# Patient Record
Sex: FEMALE | Race: WHITE | Employment: UNEMPLOYED | RURAL
[De-identification: names, ages, dates, MRNs, and addresses within clinical notes are randomized per-mention and may not be internally consistent; named-entity substitution may affect disease eponyms.]

---

## 2017-01-03 ENCOUNTER — OFFICE VISIT (OUTPATIENT)
Dept: FAMILY MEDICINE CLINIC | Age: 62
End: 2017-01-03

## 2017-01-03 VITALS
TEMPERATURE: 97.9 F | HEIGHT: 65 IN | OXYGEN SATURATION: 100 % | BODY MASS INDEX: 21.09 KG/M2 | HEART RATE: 85 BPM | DIASTOLIC BLOOD PRESSURE: 82 MMHG | SYSTOLIC BLOOD PRESSURE: 144 MMHG | RESPIRATION RATE: 18 BRPM | WEIGHT: 126.6 LBS

## 2017-01-03 DIAGNOSIS — E78.5 HYPERLIPIDEMIA ASSOCIATED WITH TYPE 2 DIABETES MELLITUS (HCC): Primary | ICD-10-CM

## 2017-01-03 DIAGNOSIS — E11.69 HYPERLIPIDEMIA ASSOCIATED WITH TYPE 2 DIABETES MELLITUS (HCC): Primary | ICD-10-CM

## 2017-01-03 DIAGNOSIS — F98.8 ADD (ATTENTION DEFICIT DISORDER): ICD-10-CM

## 2017-01-03 DIAGNOSIS — E11.9 WELL CONTROLLED TYPE 2 DIABETES MELLITUS (HCC): ICD-10-CM

## 2017-01-03 RX ORDER — SIMVASTATIN 20 MG/1
TABLET, FILM COATED ORAL
Qty: 90 TAB | Refills: 1 | Status: SHIPPED | OUTPATIENT
Start: 2017-01-03 | End: 2017-12-22 | Stop reason: SDUPTHER

## 2017-01-03 RX ORDER — DEXTROAMPHETAMINE SACCHARATE, AMPHETAMINE ASPARTATE, DEXTROAMPHETAMINE SULFATE AND AMPHETAMINE SULFATE 7.5; 7.5; 7.5; 7.5 MG/1; MG/1; MG/1; MG/1
TABLET ORAL
Qty: 30 TAB | Refills: 0 | Status: SHIPPED | OUTPATIENT
Start: 2017-01-03 | End: 2017-02-09 | Stop reason: SDUPTHER

## 2017-01-03 NOTE — PROGRESS NOTES
HISTORY OF PRESENT ILLNESS  Mona Tello is a 57 y.o. female. Follow-up   Pertinent negatives include no chest pain, no abdominal pain, no headaches and no shortness of breath. Medication Refill   Pertinent negatives include no chest pain, no abdominal pain, no headaches and no shortness of breath. ADD  She is here for a refill of her Adderall. She was started on this at least 10 years ago  by Dr Len Severs. She was started on this after her son was tested and she realized that this was happening to her as well. She is not able to focus on work without it. She takes it daily and if she skips on the weekends she doesn't get things done at home. She never did well in school due to her inattention. She has tried to go off in the past but did not function well at work. She has been taking it and took her last dose this am.  She does not have any side effects with the meds and is able to sleep. Appetite is pretty good. She takes the meds everyday. Diabetes  Current blood sugars at home have been pretty good fasting. Lab Results   Component Value Date/Time    Hemoglobin A1c 7.3 10/04/2016 11:54 AM   There have been a few  episodes of hypoglycemia when she does not eat right. Taking meds daily . On Lantus at 42 units daily. Is not going to be able to afford the Lantus and may need to change to something else. Last eye exam was a couple of years ago at [facility]. Checking feet daily. Review of Systems   Constitutional: Negative for malaise/fatigue and weight loss. HENT: Negative for congestion and sore throat. Respiratory: Negative for shortness of breath. Cardiovascular: Negative for chest pain and palpitations. Gastrointestinal: Negative for abdominal pain, blood in stool, diarrhea and heartburn. Genitourinary: Negative. Musculoskeletal: Positive for joint pain. Neurological: Negative for dizziness and headaches.    Psychiatric/Behavioral: Negative for depression, hallucinations and suicidal ideas. The patient is nervous/anxious. The patient does not have insomnia. Past Medical History   Diagnosis Date    ADD (attention deficit disorder)     Allergic rhinitis     Asthma     Bulimia     Diabetes ([facility]     Eczema     GERD (gastroesophageal reflux disease)     Headache(784.0)     Hyperlipidemia associated with type 2 diabetes mellitus ([facility] [address] [facility] 10/7/2016    Insomnia      Past Surgical History   Procedure Laterality Date    Hx hernia repair       Allergies   Allergen Reactions    Clindamycin Unknown (comments)    Codeine Unknown (comments)       Blood pressure 144/82, pulse 85, temperature 97.9 °F (36.6 °C), temperature source Oral, resp. rate 18, height 5' 4.5\" (1.638 m), weight 126 lb 9.6 oz (57.4 kg), SpO2 100 %. Physical Exam   Constitutional: She is oriented to person, place, and time. She appears well-developed and well-nourished. No distress. HENT:   Head: Normocephalic and atraumatic. Nose: Nose normal.   Mouth/Throat: Oropharynx is clear and moist.   Eyes: Conjunctivae are normal.   Neck: Neck supple. Cardiovascular: Normal rate, regular rhythm and normal heart sounds. Pulmonary/Chest: Effort normal and breath sounds normal. No respiratory distress. Neurological: She is alert and oriented to person, place, and time. Skin: Skin is warm and dry. Psychiatric: She has a normal mood and affect. Nursing note and vitals reviewed. ASSESSMENT and PLAN  ADD    UDS up to date and appropriately positive       Refill Adderall for the next 4 weeks                                           RTO 4 weeks so we can monitor her BP  Diabetes   Check labs- see orders   Continue Lantus for now. May need to change to relion if she loses her insurance. Advised regular eye exams  Elevated Blood pressure   Monitor Blood pressures at home and in the pharmacy. If it stays up will need to stop her Adderall.

## 2017-01-03 NOTE — MR AVS SNAPSHOT
Visit Information Date & Time Provider Department Dept. Phone Encounter #  
 1/3/2017  4:20 PM Tai Farmer MD [place] [id number] [facility] [phone] [id number] Follow-up Instructions Return in about 4 weeks (around 1/31/2017). Follow-up and Disposition History Upcoming Health Maintenance Date Due  
 EYE EXAM RETINAL OR DILATED Q1 6/3/1964 Pneumococcal 19-64 Medium Risk (1 of 1 - PPSV23) 6/3/1973 DTaP/Tdap/Td series (1 - Tdap) 6/3/1975 PAP AKA CERVICAL CYTOLOGY 6/3/1975 BREAST CANCER SCRN MAMMOGRAM 6/3/2004 FOBT Q 1 YEAR AGE 50-75 6/3/2004 ZOSTER VACCINE AGE 60> 6/3/2014 HEMOGLOBIN A1C Q6M 4/4/2017 FOOT EXAM Q1 10/4/2017 MICROALBUMIN Q1 10/4/2017 LIPID PANEL Q1 10/4/2017 Allergies as of 1/3/2017  Review Complete On: 1/3/2017 By: Tai Farmer MD  
  
 Severity Noted Reaction Type Reactions Clindamycin  07/02/2014    Unknown (comments) Codeine  07/02/2014    Unknown (comments) Current Immunizations  Never Reviewed No immunizations on file. Not reviewed this visit You Were Diagnosed With   
  
 Codes Comments Hyperlipidemia associated with type 2 diabetes mellitus ([facility] [address]    -  Primary ICD-10-CM: E11.69, E78.5 ICD-9-CM: 250.80, 272.4 Well controlled type 2 diabetes mellitus ([facility] [address]     ICD-10-CM: E11.9 ICD-9-CM: 250.00 ADD (attention deficit disorder)     ICD-10-CM: F98.8 ICD-9-CM: 314.00 Elevated blood pressure     ICD-10-CM: I10 
ICD-9-CM: 401.9 Vitals BP Pulse Temp Resp Height(growth percentile) Weight(growth percentile) 144/82 85 97.9 °F (36.6 °C) (Oral) 18 5' 4.5\" (1.638 m) 126 lb 9.6 oz (57.4 kg) SpO2 BMI OB Status Smoking Status 100% 21.4 kg/m2 Menopause Former Smoker Vitals History BMI and BSA Data Body Mass Index Body Surface Area  
 21.4 kg/m 2 1.62 m 2 Preferred Pharmacy Pharmacy Name Phone [facility], [place] - [address] [phone] Your Updated Medication List  
  
   
This list is accurate as of: 1/3/17  5:05 PM.  Always use your most recent med list.  
  
  
  
  
 amitriptyline 25 mg tablet Commonly known as:  ELAVIL TAKE THREE TABLETS BY MOUTH AT BEDTIME  
  
 dextroamphetamine-amphetamine 30 mg tablet Commonly known as:  ADDERALL Take 1 daily FLUoxetine 20 mg capsule Commonly known as:  PROzac TAKE ONE CAPSULE BY MOUTH ONCE DAILY  
  
 LANTUS 100 unit/mL injection Generic drug:  insulin glargine  
by SubCUTAneous route nightly. 42 units  
  
 simvastatin 20 mg tablet Commonly known as:  ZOCOR  
TAKE 1 TABLET BY MOUTH NIGHTLY Prescriptions Printed Refills  
 dextroamphetamine-amphetamine (ADDERALL) 30 mg tablet 0 Sig: Take 1 daily Class: Print Prescriptions Sent to Pharmacy Refills  
 simvastatin (ZOCOR) 20 mg tablet 1 Sig: TAKE 1 TABLET BY MOUTH NIGHTLY Class: Normal  
 Pharmacy: [address] [facility], [address] [place] [address] [address] Ph #: [phone] Follow-up Instructions Return in about 4 weeks (around 1/31/2017). Introducing [facility] & HEALTH SERVICES! Lenore Hussein introduces [employer] patient portal. Now you can access parts of your medical record, email your doctor's office, and request medication refills online. 1. In your internet browser, go to [email or web address] 2. Click on the First Time User? Click Here link in the Sign In box. You will see the New Member Sign Up page. 3. Enter your [employer] Access Code exactly as it appears below. You will not need to use this code after youve completed the sign-up process. If you do not sign up before the expiration date, you must request a new code. · [employer] Access Code: [email or web address] Expires: 4/3/2017  5:05 PM 
 
4.  Enter the last four digits of your Social Security Number (xxxx) and Date of Birth (mm/dd/yyyy) as indicated and click Submit. You will be taken to the next sign-up page. 5. Create a [employer] ID. This will be your [employer] login ID and cannot be changed, so think of one that is secure and easy to remember. 6. Create a [employer] password. You can change your password at any time. 7. Enter your Password Reset Question and Answer. This can be used at a later time if you forget your password. 8. Enter your e-mail address. You will receive e-mail notification when new information is available in [address] 9. Click Sign Up. You can now view and download portions of your medical record. 10. Click the Download Summary menu link to download a portable copy of your medical information. If you have questions, please visit the Frequently Asked Questions section of the [employer] website. Remember, [employer] is NOT to be used for urgent needs. For medical emergencies, dial 911. Now available from your iPhone and Android! Please provide this summary of care documentation to your next provider. Your primary care clinician is listed as Sunil Bourgeois. If you have any questions after today's visit, please call [phone].

## 2017-01-04 LAB
ALBUMIN SERPL-MCNC: 4.4 G/DL (ref 3.6–4.8)
ALBUMIN/GLOB SERPL: 1.8 {RATIO} (ref 1.1–2.5)
ALP SERPL-CCNC: 51 IU/L (ref 39–117)
ALT SERPL-CCNC: 12 IU/L (ref 0–32)
AST SERPL-CCNC: 14 IU/L (ref 0–40)
BILIRUB SERPL-MCNC: 0.2 MG/DL (ref 0–1.2)
BUN SERPL-MCNC: 17 MG/DL (ref 8–27)
BUN/CREAT SERPL: 26 (ref 11–26)
CALCIUM SERPL-MCNC: 9.7 MG/DL (ref 8.7–10.3)
CHLORIDE SERPL-SCNC: 100 MMOL/L (ref 96–106)
CO2 SERPL-SCNC: 26 MMOL/L (ref 18–29)
CREAT SERPL-MCNC: 0.65 MG/DL (ref 0.57–1)
EST. AVERAGE GLUCOSE BLD GHB EST-MCNC: 177 MG/DL
GLOBULIN SER CALC-MCNC: 2.5 G/DL (ref 1.5–4.5)
GLUCOSE SERPL-MCNC: 79 MG/DL (ref 65–99)
HBA1C MFR BLD: 7.8 % (ref 4.8–5.6)
POTASSIUM SERPL-SCNC: 4 MMOL/L (ref 3.5–5.2)
PROT SERPL-MCNC: 6.9 G/DL (ref 6–8.5)
SODIUM SERPL-SCNC: 141 MMOL/L (ref 134–144)

## 2017-01-04 NOTE — PROGRESS NOTES
A1c has gone up from last time. Increase Lantus to 45 units daily. Recheck A1c in 3 months. Monitor her BP as we discussed and RTO in 4 weeks with her BP log and to recheck her BP in the office.

## 2017-01-25 RX ORDER — INSULIN GLARGINE 100 [IU]/ML
42 INJECTION, SOLUTION SUBCUTANEOUS EVERY EVENING
Qty: 5 EACH | Refills: 0 | Status: SHIPPED | OUTPATIENT
Start: 2017-01-25 | End: 2017-03-17 | Stop reason: SDUPTHER

## 2017-01-25 NOTE — TELEPHONE ENCOUNTER
Patient requesting refill on Lantus. Last seen 10/4/16. Has f/u appointment on 1/31/17. Jose gave her a coupon for a $10 script. Requesting a box of 5 pens.

## 2017-01-31 RX ORDER — AMITRIPTYLINE HYDROCHLORIDE 25 MG/1
TABLET, FILM COATED ORAL
Qty: 90 TAB | Refills: 0 | Status: SHIPPED | OUTPATIENT
Start: 2017-01-31 | End: 2017-03-04 | Stop reason: SDUPTHER

## 2017-02-06 RX ORDER — FLUOXETINE HYDROCHLORIDE 20 MG/1
CAPSULE ORAL
Qty: 90 CAP | Refills: 0 | Status: SHIPPED | OUTPATIENT
Start: 2017-02-06 | End: 2017-05-15 | Stop reason: SDUPTHER

## 2017-02-09 DIAGNOSIS — F98.8 ADD (ATTENTION DEFICIT DISORDER): ICD-10-CM

## 2017-02-09 NOTE — TELEPHONE ENCOUNTER
Patient called requesting refill on Adderall 30mg. Patient scheduled an appointment with Dr. Ezequiel Hart on 2/16/17 since she could not get in to see Dr. Burrell Most until after 3/28/17. Patient also states she has been monitoring her blood pressure readings which have been much better. She believes it was \" coming from the Ibuprofen. \"

## 2017-02-13 RX ORDER — DEXTROAMPHETAMINE SACCHARATE, AMPHETAMINE ASPARTATE, DEXTROAMPHETAMINE SULFATE AND AMPHETAMINE SULFATE 7.5; 7.5; 7.5; 7.5 MG/1; MG/1; MG/1; MG/1
TABLET ORAL
Qty: 30 TAB | Refills: 0 | Status: SHIPPED | OUTPATIENT
Start: 2017-02-13 | End: 2017-03-27 | Stop reason: SDUPTHER

## 2017-02-16 ENCOUNTER — OFFICE VISIT (OUTPATIENT)
Dept: FAMILY MEDICINE CLINIC | Age: 62
End: 2017-02-16

## 2017-02-16 ENCOUNTER — TELEPHONE (OUTPATIENT)
Dept: FAMILY MEDICINE CLINIC | Age: 62
End: 2017-02-16

## 2017-02-16 VITALS
HEIGHT: 65 IN | RESPIRATION RATE: 18 BRPM | OXYGEN SATURATION: 97 % | SYSTOLIC BLOOD PRESSURE: 138 MMHG | HEART RATE: 89 BPM | DIASTOLIC BLOOD PRESSURE: 79 MMHG | TEMPERATURE: 97.2 F | BODY MASS INDEX: 21.33 KG/M2 | WEIGHT: 128 LBS

## 2017-02-16 DIAGNOSIS — R03.0 ELEVATED BLOOD PRESSURE READING WITHOUT DIAGNOSIS OF HYPERTENSION: Primary | ICD-10-CM

## 2017-02-16 DIAGNOSIS — M65.342 TRIGGER FINGER, LEFT RING FINGER: ICD-10-CM

## 2017-02-16 DIAGNOSIS — M65.341 TRIGGER FINGER, RIGHT RING FINGER: ICD-10-CM

## 2017-02-16 RX ORDER — TRIAMCINOLONE ACETONIDE 40 MG/ML
40 INJECTION, SUSPENSION INTRA-ARTICULAR; INTRAMUSCULAR ONCE
Qty: 1 ML | Refills: 0
Start: 2017-02-16 | End: 2017-02-16

## 2017-02-16 NOTE — MR AVS SNAPSHOT
Visit Information Date & Time Provider Department Dept. Phone Encounter #  
 2/16/2017  7:00 AM Daniel Ceja MD [facility] [phone] [id number] Upcoming Health Maintenance Date Due  
 EYE EXAM RETINAL OR DILATED Q1 6/3/1964 Pneumococcal 19-64 Medium Risk (1 of 1 - PPSV23) 6/3/1973 DTaP/Tdap/Td series (1 - Tdap) 6/3/1975 PAP AKA CERVICAL CYTOLOGY 6/3/1975 BREAST CANCER SCRN MAMMOGRAM 6/3/2004 FOBT Q 1 YEAR AGE 50-75 6/3/2004 ZOSTER VACCINE AGE 60> 6/3/2014 HEMOGLOBIN A1C Q6M 7/3/2017 FOOT EXAM Q1 10/4/2017 MICROALBUMIN Q1 10/4/2017 LIPID PANEL Q1 10/4/2017 Allergies as of 2/16/2017  Review Complete On: 2/16/2017 By: Daniel Ceja MD  
  
 Severity Noted Reaction Type Reactions Clindamycin  07/02/2014    Unknown (comments) Codeine  07/02/2014    Unknown (comments) Current Immunizations  Never Reviewed No immunizations on file. Not reviewed this visit Vitals BP Pulse Temp Resp Height(growth percentile) Weight(growth percentile) 138/79 (BP 1 Location: Left arm, BP Patient Position: Sitting) 89 97.2 °F (36.2 °C) 18 5' 4.5\" (1.638 m) 128 lb (58.1 kg) SpO2 BMI OB Status Smoking Status 97% 21.63 kg/m2 Menopause Former Smoker BMI and BSA Data Body Mass Index Body Surface Area  
 21.63 kg/m 2 1.63 m 2 Preferred Pharmacy Pharmacy Name Phone [facility] [place] [facility] [place] [address], [place]  [address] [phone] Your Updated Medication List  
  
   
This list is accurate as of: 2/16/17  7:48 AM.  Always use your most recent med list.  
  
  
  
  
 amitriptyline 25 mg tablet Commonly known as:  ELAVIL TAKE THREE TABLETS BY MOUTH AT BEDTIME  
  
 dextroamphetamine-amphetamine 30 mg tablet Commonly known as:  ADDERALL Earliest Fill Date: 2/13/17. Take 1 daily FLUoxetine 20 mg capsule Commonly known as:  PROzac  
 TAKE ONE CAPSULE BY MOUTH ONCE DAILY * LANTUS 100 unit/mL injection Generic drug:  insulin glargine  
by SubCUTAneous route nightly. 42 units * insulin glargine 100 unit/mL (3 mL) pen Commonly known as:  LANTUS SOLOSTAR  
42 Units by SubCUTAneous route every evening. simvastatin 20 mg tablet Commonly known as:  ZOCOR  
TAKE 1 TABLET BY MOUTH NIGHTLY * Notice: This list has 2 medication(s) that are the same as other medications prescribed for you. Read the directions carefully, and ask your doctor or other care provider to review them with you. Introducing [facility] & HE[facility]! Leticia Tan introduces [employer] patient portal. Now you can access parts of your medical record, email your doctor's office, and request medication refills online. 1. In your internet browser, go to [email or web address] 2. Click on the First Time User? Click Here link in the Sign In box. You will see the New Member Sign Up page. 3. Enter your [employer] Access Code exactly as it appears below. You will not need to use this code after youve completed the sign-up process. If you do not sign up before the expiration date, you must request a new code. · [employer] Access Code: [email or web address] Expires: 4/3/2017  5:05 PM 
 
4. Enter the last four digits of your Social Security Number (xxxx) and Date of Birth (mm/dd/yyyy) as indicated and click Submit. You will be taken to the next sign-up page. 5. Create a [employer] ID. This will be your [employer] login ID and cannot be changed, so think of one that is secure and easy to remember. 6. Create a [employer] password. You can change your password at any time. 7. Enter your Password Reset Question and Answer. This can be used at a later time if you forget your password. 8. Enter your e-mail address. You will receive e-mail notification when new information is available in [address] 9. Click Sign Up. You can now view and download portions of your medical record. 10. Click the Download Summary menu link to download a portable copy of your medical information. If you have questions, please visit the Frequently Asked Questions section of the [email or web address] website. Remember, [email or web address] is NOT to be used for urgent needs. For medical emergencies, dial 911. Now available from your iPhone and Android! Please provide this summary of care documentation to your next provider. Your primary care clinician is listed as Nicolle Garcia. If you have any questions after today's visit, please call [phone].

## 2017-02-16 NOTE — PROGRESS NOTES
Julio Vasquez is a 57 y.o. female who presents with the following:  Chief Complaint   Patient presents with    Blood Pressure Check     htn       Blood Pressure Check   The history is provided by the patient (Patient has had an episode of elevated blood pressure on last visit that almost came back to normal before leaving but was told to check back at another time. Patient has done well with no chest pain shortness of breath nor edema). Pertinent negatives include no chest pain, no abdominal pain, no headaches and no shortness of breath. Associated symptoms comments: The patient has been noting trigger finger in her left and right ring fingers. She wonders if something besides surgery can be done. .       Allergies   Allergen Reactions    Clindamycin Unknown (comments)    Codeine Unknown (comments)       Current Outpatient Prescriptions   Medication Sig    dextroamphetamine-amphetamine (ADDERALL) 30 mg tablet Earliest Fill Date: 2/13/17. Take 1 daily    FLUoxetine (PROZAC) 20 mg capsule TAKE ONE CAPSULE BY MOUTH ONCE DAILY    amitriptyline (ELAVIL) 25 mg tablet TAKE THREE TABLETS BY MOUTH AT BEDTIME    insulin glargine (LANTUS SOLOSTAR) 100 unit/mL (3 mL) pen 42 Units by SubCUTAneous route every evening.  simvastatin (ZOCOR) 20 mg tablet TAKE 1 TABLET BY MOUTH NIGHTLY    insulin glargine (LANTUS) 100 unit/mL injection by SubCUTAneous route nightly. 42 units     No current facility-administered medications for this visit.         Past Medical History   Diagnosis Date    ADD (attention deficit disorder)     Allergic rhinitis     Asthma     Bulimia     Diabetes ([address]     Eczema     GERD (gastroesophageal reflux disease)     Headache(784.0)     Hyperlipidemia associated with type 2 diabetes mellitus ([facility] [address] 10/7/2016    Insomnia        Past Surgical History   Procedure Laterality Date    Hx hernia repair         Family History   Problem Relation Age of Onset    Cancer Father      colon    COPD Father     Diabetes Other     Cancer Other      colon, grandfather/ lung cancer uncles    Hypertension Mother     Heart Failure Mother     Obesity Brother 37       Social History     Social History    Marital status: SINGLE     Spouse name: N/A    Number of children: N/A    Years of education: N/A     Social History Main Topics    Smoking status: Former Smoker    Smokeless tobacco: Never Used    Alcohol use No    Drug use: Not on file    Sexual activity: Not on file     Other Topics Concern    Not on file     Social History Narrative       Review of Systems   Respiratory: Negative for shortness of breath. Cardiovascular: Negative for chest pain. Gastrointestinal: Negative for abdominal pain. Neurological: Negative for headaches. Visit Vitals    /79 (BP 1 Location: Left arm, BP Patient Position: Sitting)    Pulse 89    Temp 97.2 °F (36.2 °C)    Resp 18    Ht 5' 4.5\" (1.638 m)    Wt 128 lb (58.1 kg)    SpO2 97%    BMI 21.63 kg/m2     Physical Exam   Constitutional: She is oriented to person, place, and time and well-developed, well-nourished, and in no distress. HENT:   Head: Normocephalic and atraumatic. Right Ear: External ear normal.   Left Ear: External ear normal.   Mouth/Throat: Oropharynx is clear and moist.   Eyes: Conjunctivae and EOM are normal. Pupils are equal, round, and reactive to light. Right eye exhibits no discharge. Left eye exhibits no discharge. Neck: Normal range of motion. Neck supple. No tracheal deviation present. No thyromegaly present. Cardiovascular: Normal rate, regular rhythm, normal heart sounds and intact distal pulses. Exam reveals no gallop and no friction rub. No murmur heard. Pulmonary/Chest: Effort normal and breath sounds normal. No respiratory distress. She has no wheezes. She exhibits no tenderness. Abdominal: Soft. Bowel sounds are normal. She exhibits no distension and no mass. There is no tenderness.  There is no rebound and no guarding. Musculoskeletal: She exhibits no edema or tenderness. Patient has trigger finger and the left ring and right ring finger   Lymphadenopathy:     She has no cervical adenopathy. Neurological: She is alert and oriented to person, place, and time. She has normal reflexes. No cranial nerve deficit. She exhibits normal muscle tone. Gait normal. Coordination normal.   Skin: Skin is warm and dry. No rash noted. No erythema. No pallor. Psychiatric: Mood, memory, affect and judgment normal.         ICD-10-CM ICD-9-CM    1. Elevated blood pressure reading without diagnosis of hypertension R03.0 796.2    2. Trigger finger, left ring finger M65.342 727.03 INJECT TENDON SHEATH/LIGAMENT   3. Trigger finger, right ring finger M65.341 727.03        Orders Placed This Encounter    INJECT TENDON SHEATH/LIGAMENT    patient was prepped with alcohol after reviewing the procedure of tendon sheath injection and the area around the tendon sheath was injected with half cc (20 mg) of Kenalog with 1% lidocaine anesthetic of half cc. Patient had a small amount of bruising about the area of the injection site but otherwise no other problems. As the left ring finger trigger had been injected, the patient was told to return to have the right ring finger done if she was satisfied with the results of the injection on the left. The patient was told that generally the injections will last from 3 months to 1 year before the condition returns. The patient was asked to keep her salt intake down and to keep her weight down to help minimize the risk of her blood pressure going back up. The patient was told that the Kenalog would make her blood sugars go up and she could expect that and that it could cause her to retain some fluid which would make her blood pressure go up temporarily.     Follow-up Disposition: Not on Lizzie Antunez MD

## 2017-03-04 RX ORDER — AMITRIPTYLINE HYDROCHLORIDE 25 MG/1
TABLET, FILM COATED ORAL
Qty: 90 TAB | Refills: 0 | Status: SHIPPED | OUTPATIENT
Start: 2017-03-04 | End: 2017-03-29 | Stop reason: SDUPTHER

## 2017-03-17 RX ORDER — INSULIN GLARGINE 100 [IU]/ML
INJECTION, SOLUTION SUBCUTANEOUS
Qty: 15 ADJUSTABLE DOSE PRE-FILLED PEN SYRINGE | Refills: 0 | Status: SHIPPED | OUTPATIENT
Start: 2017-03-17 | End: 2017-05-02 | Stop reason: SDUPTHER

## 2017-03-27 DIAGNOSIS — F98.8 ADD (ATTENTION DEFICIT DISORDER): ICD-10-CM

## 2017-03-28 RX ORDER — DEXTROAMPHETAMINE SACCHARATE, AMPHETAMINE ASPARTATE, DEXTROAMPHETAMINE SULFATE AND AMPHETAMINE SULFATE 7.5; 7.5; 7.5; 7.5 MG/1; MG/1; MG/1; MG/1
TABLET ORAL
Qty: 30 TAB | Refills: 0 | Status: SHIPPED | OUTPATIENT
Start: 2017-03-28 | End: 2017-05-02 | Stop reason: SDUPTHER

## 2017-03-30 RX ORDER — AMITRIPTYLINE HYDROCHLORIDE 25 MG/1
TABLET, FILM COATED ORAL
Qty: 90 TAB | Refills: 0 | Status: SHIPPED | OUTPATIENT
Start: 2017-03-30 | End: 2017-04-30 | Stop reason: SDUPTHER

## 2017-04-30 RX ORDER — AMITRIPTYLINE HYDROCHLORIDE 25 MG/1
TABLET, FILM COATED ORAL
Qty: 90 TAB | Refills: 0 | Status: SHIPPED | OUTPATIENT
Start: 2017-04-30 | End: 2017-05-28 | Stop reason: SDUPTHER

## 2017-05-02 DIAGNOSIS — F98.8 ADD (ATTENTION DEFICIT DISORDER): ICD-10-CM

## 2017-05-02 RX ORDER — INSULIN GLARGINE 100 [IU]/ML
INJECTION, SOLUTION SUBCUTANEOUS
Qty: 15 ADJUSTABLE DOSE PRE-FILLED PEN SYRINGE | Refills: 0 | Status: SHIPPED | OUTPATIENT
Start: 2017-05-02 | End: 2017-06-18 | Stop reason: SDUPTHER

## 2017-05-02 RX ORDER — DEXTROAMPHETAMINE SACCHARATE, AMPHETAMINE ASPARTATE, DEXTROAMPHETAMINE SULFATE AND AMPHETAMINE SULFATE 7.5; 7.5; 7.5; 7.5 MG/1; MG/1; MG/1; MG/1
TABLET ORAL
Qty: 30 TAB | Refills: 0 | Status: SHIPPED | OUTPATIENT
Start: 2017-05-02 | End: 2017-06-13 | Stop reason: SDUPTHER

## 2017-05-15 RX ORDER — FLUOXETINE HYDROCHLORIDE 20 MG/1
CAPSULE ORAL
Qty: 90 CAP | Refills: 0 | Status: SHIPPED | OUTPATIENT
Start: 2017-05-15 | End: 2017-07-30 | Stop reason: SDUPTHER

## 2017-05-28 RX ORDER — AMITRIPTYLINE HYDROCHLORIDE 25 MG/1
TABLET, FILM COATED ORAL
Qty: 90 TAB | Refills: 0 | Status: SHIPPED | OUTPATIENT
Start: 2017-05-28 | End: 2017-06-29 | Stop reason: SDUPTHER

## 2017-06-12 DIAGNOSIS — F98.8 ADD (ATTENTION DEFICIT DISORDER): ICD-10-CM

## 2017-06-12 RX ORDER — DEXTROAMPHETAMINE SACCHARATE, AMPHETAMINE ASPARTATE, DEXTROAMPHETAMINE SULFATE AND AMPHETAMINE SULFATE 7.5; 7.5; 7.5; 7.5 MG/1; MG/1; MG/1; MG/1
TABLET ORAL
Qty: 30 TAB | Refills: 0 | OUTPATIENT
Start: 2017-06-12

## 2017-06-13 ENCOUNTER — OFFICE VISIT (OUTPATIENT)
Dept: FAMILY MEDICINE CLINIC | Age: 62
End: 2017-06-13

## 2017-06-13 VITALS
TEMPERATURE: 97.2 F | WEIGHT: 124 LBS | DIASTOLIC BLOOD PRESSURE: 88 MMHG | RESPIRATION RATE: 18 BRPM | HEIGHT: 65 IN | OXYGEN SATURATION: 95 % | HEART RATE: 74 BPM | BODY MASS INDEX: 20.66 KG/M2 | SYSTOLIC BLOOD PRESSURE: 152 MMHG

## 2017-06-13 DIAGNOSIS — E11.9 WELL CONTROLLED TYPE 2 DIABETES MELLITUS (HCC): Primary | ICD-10-CM

## 2017-06-13 DIAGNOSIS — F98.8 ADD (ATTENTION DEFICIT DISORDER): ICD-10-CM

## 2017-06-13 RX ORDER — DEXTROAMPHETAMINE SACCHARATE, AMPHETAMINE ASPARTATE, DEXTROAMPHETAMINE SULFATE AND AMPHETAMINE SULFATE 7.5; 7.5; 7.5; 7.5 MG/1; MG/1; MG/1; MG/1
TABLET ORAL
Qty: 30 TAB | Refills: 0 | Status: SHIPPED | OUTPATIENT
Start: 2017-06-13 | End: 2017-06-13 | Stop reason: SDUPTHER

## 2017-06-13 RX ORDER — DEXTROAMPHETAMINE SACCHARATE, AMPHETAMINE ASPARTATE, DEXTROAMPHETAMINE SULFATE AND AMPHETAMINE SULFATE 7.5; 7.5; 7.5; 7.5 MG/1; MG/1; MG/1; MG/1
TABLET ORAL
Qty: 30 TAB | Refills: 0 | Status: SHIPPED | OUTPATIENT
Start: 2017-06-13 | End: 2017-10-10 | Stop reason: SDUPTHER

## 2017-06-13 NOTE — MR AVS SNAPSHOT
Visit Information Date & Time Provider Department Dept. Phone Encounter #  
 6/13/2017  5:40 PM Amandeep Encinas MD [facility] Primary [facility] [phone] [id number] Follow-up Instructions Return in about 3 months (around 9/13/2017). Upcoming Health Maintenance Date Due  
 EYE EXAM RETINAL OR DILATED Q1 6/3/1964 Pneumococcal 19-64 Medium Risk (1 of 1 - PPSV23) 6/3/1973 DTaP/Tdap/Td series (1 - Tdap) 6/3/1975 PAP AKA CERVICAL CYTOLOGY 6/3/1975 BREAST CANCER SCRN MAMMOGRAM 6/3/2004 FOBT Q 1 YEAR AGE 50-75 6/3/2004 ZOSTER VACCINE AGE 60> 6/3/2014 HEMOGLOBIN A1C Q6M 7/3/2017 INFLUENZA AGE 9 TO ADULT 8/1/2017 FOOT EXAM Q1 10/4/2017 MICROALBUMIN Q1 10/4/2017 LIPID PANEL Q1 10/4/2017 Allergies as of 6/13/2017  Review Complete On: 6/13/2017 By: Amandeep Encinas MD  
  
 Severity Noted Reaction Type Reactions Clindamycin  07/02/2014    Unknown (comments) Codeine  07/02/2014    Unknown (comments) Current Immunizations  Never Reviewed No immunizations on file. Not reviewed this visit You Were Diagnosed With   
  
 Codes Comments Well controlled type 2 diabetes mellitus ([facility]    -  Primary ICD-10-CM: E11.9 ICD-9-CM: 250.00 ADD (attention deficit disorder)     ICD-10-CM: F98.8 ICD-9-CM: 314.00 Vitals BP Pulse Temp Resp Height(growth percentile) Weight(growth percentile) 152/88 (BP 1 Location: Left arm, BP Patient Position: Sitting) 74 97.2 °F (36.2 °C) 18 5' 4.5\" (1.638 m) 124 lb (56.2 kg) SpO2 BMI OB Status Smoking Status 95% 20.96 kg/m2 Menopause Former Smoker BMI and BSA Data Body Mass Index Body Surface Area  
 20.96 kg/m 2 1.6 m 2 Preferred Pharmacy Pharmacy Name Phone [facility] [address], [place] - [address] [phone] Your Updated Medication List  
  
   
 This list is accurate as of: 6/13/17  6:03 PM.  Always use your most recent med list.  
  
  
  
  
 amitriptyline 25 mg tablet Commonly known as:  ELAVIL TAKE THREE TABLETS BY MOUTH AT BEDTIME  
  
 dextroamphetamine-amphetamine 30 mg tablet Commonly known as:  ADDERALL Take 1 daily- fill 8/9/17 FLUoxetine 20 mg capsule Commonly known as:  PROzac TAKE ONE CAPSULE BY MOUTH ONCE DAILY * LANTUS 100 unit/mL injection Generic drug:  insulin glargine  
by SubCUTAneous route nightly. 42 units * LANTUS SOLOSTAR 100 unit/mL (3 mL) Inpn Generic drug:  insulin glargine INJECT 42 UNITS SUBCUTANEOUSLY EVERY EVENING  
  
 simvastatin 20 mg tablet Commonly known as:  ZOCOR  
TAKE 1 TABLET BY MOUTH NIGHTLY * Notice: This list has 2 medication(s) that are the same as other medications prescribed for you. Read the directions carefully, and ask your doctor or other care provider to review them with you. Prescriptions Printed Refills  
 dextroamphetamine-amphetamine (ADDERALL) 30 mg tablet 0 Sig: Take 1 daily- fill 8/9/17 Class: Print We Performed the Following COLLECTION VENOUS BLOOD,VENIPUNCTURE I5000793 CPT(R)] HEMOGLOBIN A1C WITH EAG [15364 CPT(R)] METABOLIC PANEL, COMPREHENSIVE [97082 CPT(R)] Follow-up Instructions Return in about 3 months (around 9/13/2017). Introducing \[facility]\"" & HEALTH SERVICES! Mehnaz Reyes introduces First Meta patient portal. Now you can access parts of your medical record, email your doctor's office, and request medication refills online. 1. In your internet browser, go to [email or web address] 2. Click on the First Time User? Click Here link in the Sign In box. You will see the New Member Sign Up page. 3. Enter your First Meta Access Code exactly as it appears below. You will not need to use this code after youve completed the sign-up process.  If you do not sign up before the expiration date, you must request a new code. · Kyron Access Code: [email or web address] Expires: 9/11/2017  6:03 PM 
 
4. Enter the last four digits of your Social Security Number (xxxx) and Date of Birth (mm/dd/yyyy) as indicated and click Submit. You will be taken to the next sign-up page. 5. Create a Kyron ID. This will be your Kyron login ID and cannot be changed, so think of one that is secure and easy to remember. 6. Create a Kyron password. You can change your password at any time. 7. Enter your Password Reset Question and Answer. This can be used at a later time if you forget your password. 8. Enter your e-mail address. You will receive e-mail notification when new information is available in [address] 9. Click Sign Up. You can now view and download portions of your medical record. 10. Click the Download Summary menu link to download a portable copy of your medical information. If you have questions, please visit the Frequently Asked Questions section of the Kyron website. Remember, Kyron is NOT to be used for urgent needs. For medical emergencies, dial 911. Now available from your iPhone and Android! Please provide this summary of care documentation to your next provider. Your primary care clinician is listed as Philomena Patel. If you have any questions after today's visit, please call [phone].

## 2017-06-15 LAB
ALBUMIN SERPL-MCNC: 3.9 G/DL (ref 3.6–4.8)
ALBUMIN/GLOB SERPL: 1.6 {RATIO} (ref 1.2–2.2)
ALP SERPL-CCNC: 46 IU/L (ref 39–117)
ALT SERPL-CCNC: 15 IU/L (ref 0–32)
AST SERPL-CCNC: 17 IU/L (ref 0–40)
BILIRUB SERPL-MCNC: <0.2 MG/DL (ref 0–1.2)
BUN SERPL-MCNC: 18 MG/DL (ref 8–27)
BUN/CREAT SERPL: 27 (ref 12–28)
CALCIUM SERPL-MCNC: 9.4 MG/DL (ref 8.7–10.3)
CHLORIDE SERPL-SCNC: 95 MMOL/L (ref 96–106)
CO2 SERPL-SCNC: 26 MMOL/L (ref 18–29)
CREAT SERPL-MCNC: 0.66 MG/DL (ref 0.57–1)
EST. AVERAGE GLUCOSE BLD GHB EST-MCNC: 174 MG/DL
GLOBULIN SER CALC-MCNC: 2.4 G/DL (ref 1.5–4.5)
GLUCOSE SERPL-MCNC: 287 MG/DL (ref 65–99)
HBA1C MFR BLD: 7.7 % (ref 4.8–5.6)
POTASSIUM SERPL-SCNC: 4.3 MMOL/L (ref 3.5–5.2)
PROT SERPL-MCNC: 6.3 G/DL (ref 6–8.5)
SODIUM SERPL-SCNC: 135 MMOL/L (ref 134–144)

## 2017-06-19 RX ORDER — INSULIN GLARGINE 100 [IU]/ML
INJECTION, SOLUTION SUBCUTANEOUS
Qty: 15 ADJUSTABLE DOSE PRE-FILLED PEN SYRINGE | Refills: 3 | Status: SHIPPED | OUTPATIENT
Start: 2017-06-19 | End: 2017-11-30 | Stop reason: SDUPTHER

## 2017-06-29 RX ORDER — AMITRIPTYLINE HYDROCHLORIDE 25 MG/1
TABLET, FILM COATED ORAL
Qty: 90 TAB | Refills: 0 | Status: SHIPPED | OUTPATIENT
Start: 2017-06-29 | End: 2017-07-30 | Stop reason: SDUPTHER

## 2017-07-31 RX ORDER — FLUOXETINE HYDROCHLORIDE 20 MG/1
CAPSULE ORAL
Qty: 90 CAP | Refills: 0 | Status: SHIPPED | OUTPATIENT
Start: 2017-07-31 | End: 2017-09-29 | Stop reason: SDUPTHER

## 2017-07-31 RX ORDER — AMITRIPTYLINE HYDROCHLORIDE 25 MG/1
TABLET, FILM COATED ORAL
Qty: 90 TAB | Refills: 0 | Status: SHIPPED | OUTPATIENT
Start: 2017-07-31 | End: 2017-08-29 | Stop reason: SDUPTHER

## 2017-08-30 RX ORDER — AMITRIPTYLINE HYDROCHLORIDE 25 MG/1
TABLET, FILM COATED ORAL
Qty: 90 TAB | Refills: 0 | Status: SHIPPED | OUTPATIENT
Start: 2017-08-30 | End: 2017-09-29 | Stop reason: SDUPTHER

## 2017-09-29 RX ORDER — AMITRIPTYLINE HYDROCHLORIDE 25 MG/1
TABLET, FILM COATED ORAL
Qty: 90 TAB | Refills: 0 | Status: SHIPPED | OUTPATIENT
Start: 2017-09-29 | End: 2017-10-31 | Stop reason: SDUPTHER

## 2017-09-29 RX ORDER — FLUOXETINE HYDROCHLORIDE 20 MG/1
CAPSULE ORAL
Qty: 90 CAP | Refills: 0 | Status: SHIPPED | OUTPATIENT
Start: 2017-09-29 | End: 2017-11-19 | Stop reason: SDUPTHER

## 2017-10-10 ENCOUNTER — OFFICE VISIT (OUTPATIENT)
Dept: FAMILY MEDICINE CLINIC | Age: 62
End: 2017-10-10

## 2017-10-10 VITALS
DIASTOLIC BLOOD PRESSURE: 89 MMHG | RESPIRATION RATE: 18 BRPM | SYSTOLIC BLOOD PRESSURE: 137 MMHG | BODY MASS INDEX: 21.04 KG/M2 | WEIGHT: 123.25 LBS | OXYGEN SATURATION: 98 % | HEIGHT: 64 IN | HEART RATE: 82 BPM

## 2017-10-10 DIAGNOSIS — E11.9 WELL CONTROLLED TYPE 2 DIABETES MELLITUS (HCC): Primary | ICD-10-CM

## 2017-10-10 DIAGNOSIS — F90.0 ATTENTION DEFICIT HYPERACTIVITY DISORDER (ADHD), PREDOMINANTLY INATTENTIVE TYPE: ICD-10-CM

## 2017-10-10 DIAGNOSIS — Z12.39 SCREENING FOR BREAST CANCER: ICD-10-CM

## 2017-10-10 RX ORDER — DEXTROAMPHETAMINE SACCHARATE, AMPHETAMINE ASPARTATE, DEXTROAMPHETAMINE SULFATE AND AMPHETAMINE SULFATE 7.5; 7.5; 7.5; 7.5 MG/1; MG/1; MG/1; MG/1
TABLET ORAL
Qty: 30 TAB | Refills: 0 | Status: SHIPPED | OUTPATIENT
Start: 2017-10-10 | End: 2017-11-14 | Stop reason: SDUPTHER

## 2017-10-10 NOTE — MR AVS SNAPSHOT
Visit Information Date & Time Provider Department Dept. Phone Encounter #  
 10/10/2017  6:00 PM Ned Larson MD [facility] Primary Care [phone] [id number] Upcoming Health Maintenance Date Due  
 EYE EXAM RETINAL OR DILATED Q1 6/3/1964 Pneumococcal 19-64 Medium Risk (1 of 1 - PPSV23) 6/3/1973 DTaP/Tdap/Td series (1 - Tdap) 6/3/1975 PAP AKA CERVICAL CYTOLOGY 6/3/1975 BREAST CANCER SCRN MAMMOGRAM 6/3/2004 FOBT Q 1 YEAR AGE 50-75 6/3/2004 ZOSTER VACCINE AGE 60> 4/3/2014 FOOT EXAM Q1 10/4/2017 MICROALBUMIN Q1 10/4/2017 LIPID PANEL Q1 10/4/2017 HEMOGLOBIN A1C Q6M 12/13/2017 Allergies as of 10/10/2017  Review Complete On: 10/10/2017 By: Ned Larson MD  
  
 Severity Noted Reaction Type Reactions Clindamycin  07/02/2014    Unknown (comments) Codeine  07/02/2014    Unknown (comments) Current Immunizations  Never Reviewed No immunizations on file. Not reviewed this visit You Were Diagnosed With   
  
 Codes Comments Well controlled type 2 diabetes mellitus ([facility]    -  Primary ICD-10-CM: E11.9 ICD-9-CM: 250.00 Attention deficit hyperactivity disorder (ADHD), predominantly inattentive type     ICD-10-CM: F90.0 ICD-9-CM: 314.00 Screening for breast cancer     ICD-10-CM: Z12.31 
ICD-9-CM: V76.10 Vitals BP Pulse Resp Height(growth percentile) Weight(growth percentile) SpO2  
 137/89 (BP 1 Location: Right arm) 82 18 5' 4\" (1.626 m) 123 lb 4 oz (55.9 kg) 98% BMI OB Status Smoking Status 21.16 kg/m2 Menopause Former Smoker Vitals History BMI and BSA Data Body Mass Index Body Surface Area  
 21.16 kg/m 2 1.59 m 2 Preferred Pharmacy Pharmacy Name Phone [facility] [address], [place] [address] [phone] Your Updated Medication List  
  
   
This list is accurate as of: 10/10/17  6:43 PM.  Always use your most recent med list.  
  
  
  
  
 amitriptyline 25 mg tablet Commonly known as:  ELAVIL TAKE THREE TABLETS BY MOUTH AT BEDTIME  
  
 dextroamphetamine-amphetamine 30 mg tablet Commonly known as:  ADDERALL Take 1 daily FLUoxetine 20 mg capsule Commonly known as:  PROzac TAKE ONE CAPSULE BY MOUTH ONCE DAILY * LANTUS 100 unit/mL injection Generic drug:  insulin glargine  
by SubCUTAneous route nightly. 42 units * LANTUS SOLOSTAR 100 unit/mL (3 mL) Inpn Generic drug:  insulin glargine INJECT 42 UNITS SUBCUTANEOUSLY EVERY EVENING  
  
 simvastatin 20 mg tablet Commonly known as:  ZOCOR  
TAKE 1 TABLET BY MOUTH NIGHTLY * Notice: This list has 2 medication(s) that are the same as other medications prescribed for you. Read the directions carefully, and ask your doctor or other care provider to review them with you. Prescriptions Printed Refills  
 dextroamphetamine-amphetamine (ADDERALL) 30 mg tablet 0 Sig: Take 1 daily Class: Print We Performed the Following CBC WITH AUTOMATED DIFF [32194 CPT(R)] COLLECTION VENOUS BLOOD,VENIPUNCTURE [id number] CPT(R)] HEMOGLOBIN A1C WITH EAG [49788 CPT(R)]  DIABETES FOOT EXAM [7 Custom] METABOLIC PANEL, COMPREHENSIVE [71432 CPT(R)] MICROALB/CREAT RATIO, TIMED UR [id number] CPT(R)] TSH 3RD GENERATION [57257 CPT(R)] To-Do List   
 10/10/2017 Imaging:  YESENIA MAMMO BI SCREENING INCL CAD Introducing [facility] & HEALTH SERVICES! Leticia Tan introduces MyDatingTree patient portal. Now you can access parts of your medical record, email your doctor's office, and request medication refills online. 1. In your internet browser, go to [email or web address] 2. Click on the First Time User? Click Here link in the Sign In box. You will see the New Member Sign Up page. 3. Enter your MyDatingTree Access Code exactly as it appears below.  You will not need to use this code after youve completed the sign-up process. If you do not sign up before the expiration date, you must request a new code. · CrowdTogether Access Code: [email or web address] Expires: 1/8/2018  6:39 PM 
 
4. Enter the last four digits of your Social Security Number (xxxx) and Date of Birth (mm/dd/yyyy) as indicated and click Submit. You will be taken to the next sign-up page. 5. Create a CrowdTogether ID. This will be your CrowdTogether login ID and cannot be changed, so think of one that is secure and easy to remember. 6. Create a CrowdTogether password. You can change your password at any time. 7. Enter your Password Reset Question and Answer. This can be used at a later time if you forget your password. 8. Enter your e-mail address. You will receive e-mail notification when new information is available in [address] 9. Click Sign Up. You can now view and download portions of your medical record. 10. Click the Download Summary menu link to download a portable copy of your medical information. If you have questions, please visit the Frequently Asked Questions section of the CrowdTogether website. Remember, CrowdTogether is NOT to be used for urgent needs. For medical emergencies, dial 911. Now available from your iPhone and Android! Please provide this summary of care documentation to your next provider. Your primary care clinician is listed as Lisset Carmona. If you have any questions after today's visit, please call [phone].

## 2017-10-10 NOTE — PROGRESS NOTES
HISTORY OF PRESENT ILLNESS  Xu Armando is a 60 y.o. female. Follow-up     ADD  She is here for a refill of her Adderall. She was started on this at least 10 years ago  by Dr Little Krueger. She was started on this after her son was tested and she realized that this was happening to her as well. She is not able to focus on work without it. She takes it daily and if she skips on the weekends she doesn't get things done at home. She never did well in school due to her inattention. She has tried to go off in the past but did not function well at work. She has been taking it and took her last dose this am.  She does not have any side effects with the meds and is able to sleep. Appetite is pretty good. She takes the meds everyday. Diabetes  Current blood sugars at home have been pretty good fasting. Lab Results   Component Value Date/Time    Hemoglobin A1c 7.7 06/13/2017 05:59 PM   There have been a few  episodes of hypoglycemia when she does not eat right. Taking meds daily . On Lantus at 42 units daily. Is not going to be able to afford the Lantus and may need to change to something else. Last eye exam was a couple of years ago at [facility]. Will be scheduling this soon. Checking feet daily. Insect bite under her chin. Not sure what bit her. Review of Systems   Constitutional: Negative for weight loss. HENT: Negative for congestion and sore throat. Gastrointestinal: Negative for blood in stool, diarrhea and heartburn. Genitourinary: Negative. Musculoskeletal: Positive for joint pain. Psychiatric/Behavioral: Negative for depression, hallucinations and suicidal ideas. The patient is nervous/anxious. The patient does not have insomnia.       Past Medical History:   Diagnosis Date    ADD (attention deficit disorder)     Allergic rhinitis     Asthma     Bulimia     Diabetes ([address]     Eczema     GERD (gastroesophageal reflux disease)     Headache(784.0)     Hyperlipidemia associated with type 2 diabetes mellitus ([facility] 10/7/2016    Insomnia      Past Surgical History:   Procedure Laterality Date    HX HERNIA REPAIR       Allergies   Allergen Reactions    Clindamycin Unknown (comments)    Codeine Unknown (comments)       Blood pressure 137/89, pulse 82, resp. rate 18, height 5' 4\" (1.626 m), weight 123 lb 4 oz (55.9 kg), SpO2 98 %. Physical Exam   Constitutional: She is oriented to person, place, and time. She appears well-developed and well-nourished. No distress. HENT:   Head: Normocephalic and atraumatic. Nose: Nose normal.   Mouth/Throat: Oropharynx is clear and moist.   Eyes: Conjunctivae are normal.   Neck: Neck supple. Cardiovascular: Normal rate, regular rhythm and normal heart sounds. Pulmonary/Chest: Effort normal and breath sounds normal. No respiratory distress. Neurological: She is alert and oriented to person, place, and time. Skin: Skin is warm. 2cm diameter raised erythematous area under her chin   Psychiatric: She has a normal mood and affect. Nursing note and vitals reviewed. Diabetic foot exam:     Left:    Sharp/dull discrimination normal    Filament test normal sensation with micro filament   Pulse DP: 2+ (normal)   Pulse PT: 2+ (normal)   Deformities: None  Right:    Sharp/dull discrimination normal   Filament test normal sensation with micro filament   Pulse DP: 2+ (normal)   Pulse PT: 2+ (normal)   Deformities: None    ASSESSMENT and PLAN  ADD   UDS up to date and appropriately positive       Refill Adderall daily #30                                   RTO 3 months  Diabetes   Check labs- see orders   Continue Lantus for now.     Advised regular eye and foot exams  Insect bite   Hydrocortisone cream OTC with bacitracin   RTO if no improvement

## 2017-10-11 LAB
ALBUMIN 24H UR-MRATE: NORMAL MG/DAY
ALBUMIN ?TM UR-MRATE: NORMAL UG/MIN (ref 0–20)
ALBUMIN SERPL-MCNC: 4 G/DL (ref 3.6–4.8)
ALBUMIN/CREAT UR: 6.3 UG/MG CREAT (ref 0–30)
ALBUMIN/GLOB SERPL: 1.5 {RATIO} (ref 1.2–2.2)
ALP SERPL-CCNC: 54 IU/L (ref 39–117)
ALT SERPL-CCNC: 12 IU/L (ref 0–32)
AST SERPL-CCNC: 20 IU/L (ref 0–40)
BASOPHILS # BLD AUTO: 0 X10E3/UL (ref 0–0.2)
BASOPHILS NFR BLD AUTO: 0 %
BILIRUB SERPL-MCNC: <0.2 MG/DL (ref 0–1.2)
BUN SERPL-MCNC: 16 MG/DL (ref 8–27)
BUN/CREAT SERPL: 25 (ref 12–28)
CALCIUM SERPL-MCNC: 9.6 MG/DL (ref 8.7–10.3)
CHLORIDE SERPL-SCNC: 98 MMOL/L (ref 96–106)
CO2 SERPL-SCNC: 28 MMOL/L (ref 18–29)
CREAT SERPL-MCNC: 0.63 MG/DL (ref 0.57–1)
CREAT UR-MCNC: 95.7 MG/DL
EOSINOPHIL # BLD AUTO: 0.3 X10E3/UL (ref 0–0.4)
EOSINOPHIL NFR BLD AUTO: 5 %
ERYTHROCYTE [DISTWIDTH] IN BLOOD BY AUTOMATED COUNT: 17.7 % (ref 12.3–15.4)
EST. AVERAGE GLUCOSE BLD GHB EST-MCNC: 197 MG/DL
GLOBULIN SER CALC-MCNC: 2.7 G/DL (ref 1.5–4.5)
GLUCOSE SERPL-MCNC: 62 MG/DL (ref 65–99)
HBA1C MFR BLD: 8.5 % (ref 4.8–5.6)
HCT VFR BLD AUTO: 34.5 % (ref 34–46.6)
HGB BLD-MCNC: 11.1 G/DL (ref 11.1–15.9)
IMM GRANULOCYTES # BLD: 0 X10E3/UL (ref 0–0.1)
IMM GRANULOCYTES NFR BLD: 1 %
LYMPHOCYTES # BLD AUTO: 1.5 X10E3/UL (ref 0.7–3.1)
LYMPHOCYTES NFR BLD AUTO: 28 %
MCH RBC QN AUTO: 27.1 PG (ref 26.6–33)
MCHC RBC AUTO-ENTMCNC: 32.2 G/DL (ref 31.5–35.7)
MCV RBC AUTO: 84 FL (ref 79–97)
MICROALBUMIN UR-MCNC: 6 UG/ML
MONOCYTES # BLD AUTO: 0.5 X10E3/UL (ref 0.1–0.9)
MONOCYTES NFR BLD AUTO: 10 %
NEUTROPHILS # BLD AUTO: 2.9 X10E3/UL (ref 1.4–7)
NEUTROPHILS NFR BLD AUTO: 56 %
PLATELET # BLD AUTO: 333 X10E3/UL (ref 150–379)
POTASSIUM SERPL-SCNC: 3.8 MMOL/L (ref 3.5–5.2)
PROT SERPL-MCNC: 6.7 G/DL (ref 6–8.5)
RBC # BLD AUTO: 4.1 X10E6/UL (ref 3.77–5.28)
SODIUM SERPL-SCNC: 140 MMOL/L (ref 134–144)
TSH SERPL DL<=0.005 MIU/L-ACNC: 1.94 UIU/ML (ref 0.45–4.5)
WBC # BLD AUTO: 5.2 X10E3/UL (ref 3.4–10.8)

## 2017-11-01 RX ORDER — AMITRIPTYLINE HYDROCHLORIDE 25 MG/1
TABLET, FILM COATED ORAL
Qty: 90 TAB | Refills: 0 | Status: SHIPPED | OUTPATIENT
Start: 2017-11-01 | End: 2017-12-02 | Stop reason: SDUPTHER

## 2017-11-14 RX ORDER — DEXTROAMPHETAMINE SACCHARATE, AMPHETAMINE ASPARTATE, DEXTROAMPHETAMINE SULFATE AND AMPHETAMINE SULFATE 7.5; 7.5; 7.5; 7.5 MG/1; MG/1; MG/1; MG/1
TABLET ORAL
Qty: 30 TAB | Refills: 0 | Status: SHIPPED | OUTPATIENT
Start: 2017-11-14 | End: 2017-12-20 | Stop reason: SDUPTHER

## 2017-11-19 RX ORDER — FLUOXETINE HYDROCHLORIDE 20 MG/1
CAPSULE ORAL
Qty: 90 CAP | Refills: 0 | Status: SHIPPED | OUTPATIENT
Start: 2017-11-19 | End: 2018-02-28 | Stop reason: SDUPTHER

## 2017-12-01 RX ORDER — INSULIN GLARGINE 100 [IU]/ML
INJECTION, SOLUTION SUBCUTANEOUS
Qty: 15 ADJUSTABLE DOSE PRE-FILLED PEN SYRINGE | Refills: 3 | Status: SHIPPED | OUTPATIENT
Start: 2017-12-01 | End: 2018-05-16 | Stop reason: SDUPTHER

## 2017-12-02 RX ORDER — AMITRIPTYLINE HYDROCHLORIDE 25 MG/1
TABLET, FILM COATED ORAL
Qty: 90 TAB | Refills: 0 | Status: SHIPPED | OUTPATIENT
Start: 2017-12-02 | End: 2017-12-22 | Stop reason: SDUPTHER

## 2017-12-20 DIAGNOSIS — F90.9 ATTENTION DEFICIT HYPERACTIVITY DISORDER (ADHD), UNSPECIFIED ADHD TYPE: Primary | ICD-10-CM

## 2017-12-20 RX ORDER — DEXTROAMPHETAMINE SACCHARATE, AMPHETAMINE ASPARTATE, DEXTROAMPHETAMINE SULFATE AND AMPHETAMINE SULFATE 7.5; 7.5; 7.5; 7.5 MG/1; MG/1; MG/1; MG/1
TABLET ORAL
Qty: 30 TAB | Refills: 0 | Status: SHIPPED | OUTPATIENT
Start: 2017-12-20 | End: 2018-01-09 | Stop reason: SDUPTHER

## 2017-12-22 RX ORDER — SIMVASTATIN 20 MG/1
TABLET, FILM COATED ORAL
Qty: 90 TAB | Refills: 1 | Status: SHIPPED | OUTPATIENT
Start: 2017-12-22 | End: 2018-06-04 | Stop reason: SDUPTHER

## 2017-12-22 RX ORDER — AMITRIPTYLINE HYDROCHLORIDE 25 MG/1
TABLET, FILM COATED ORAL
Qty: 90 TAB | Refills: 0 | Status: SHIPPED | OUTPATIENT
Start: 2017-12-22 | End: 2018-01-18 | Stop reason: SDUPTHER

## 2018-01-09 ENCOUNTER — OFFICE VISIT (OUTPATIENT)
Dept: FAMILY MEDICINE CLINIC | Age: 63
End: 2018-01-09

## 2018-01-09 VITALS
HEIGHT: 64 IN | BODY MASS INDEX: 21.19 KG/M2 | RESPIRATION RATE: 18 BRPM | DIASTOLIC BLOOD PRESSURE: 82 MMHG | TEMPERATURE: 97.5 F | OXYGEN SATURATION: 95 % | WEIGHT: 124.13 LBS | HEART RATE: 83 BPM | SYSTOLIC BLOOD PRESSURE: 139 MMHG

## 2018-01-09 DIAGNOSIS — M25.542 ARTHRALGIA OF BOTH HANDS: ICD-10-CM

## 2018-01-09 DIAGNOSIS — E11.9 WELL CONTROLLED TYPE 2 DIABETES MELLITUS (HCC): Primary | ICD-10-CM

## 2018-01-09 DIAGNOSIS — M25.541 ARTHRALGIA OF BOTH HANDS: ICD-10-CM

## 2018-01-09 DIAGNOSIS — F90.9 ATTENTION DEFICIT HYPERACTIVITY DISORDER (ADHD), UNSPECIFIED ADHD TYPE: ICD-10-CM

## 2018-01-09 DIAGNOSIS — F90.0 ATTENTION DEFICIT HYPERACTIVITY DISORDER (ADHD), PREDOMINANTLY INATTENTIVE TYPE: ICD-10-CM

## 2018-01-09 RX ORDER — DEXTROAMPHETAMINE SACCHARATE, AMPHETAMINE ASPARTATE, DEXTROAMPHETAMINE SULFATE AND AMPHETAMINE SULFATE 7.5; 7.5; 7.5; 7.5 MG/1; MG/1; MG/1; MG/1
TABLET ORAL
Qty: 30 TAB | Refills: 0 | Status: SHIPPED | OUTPATIENT
Start: 2018-01-09 | End: 2018-02-27 | Stop reason: SDUPTHER

## 2018-01-09 NOTE — MR AVS SNAPSHOT
Visit Information Date & Time Provider Department Dept. Phone Encounter #  
 1/9/2018  5:20 PM Sang Cohn MD [facility] Primary Care [phone] [id number] Upcoming Health Maintenance Date Due  
 EYE EXAM RETINAL OR DILATED Q1 6/3/1964 Pneumococcal 19-64 Medium Risk (1 of 1 - PPSV23) 6/3/1973 DTaP/Tdap/Td series (1 - Tdap) 6/3/1975 PAP AKA CERVICAL CYTOLOGY 6/3/1975 FOBT Q 1 YEAR AGE 50-75 6/3/2004 ZOSTER VACCINE AGE 60> 4/3/2014 LIPID PANEL Q1 10/4/2017 HEMOGLOBIN A1C Q6M 4/10/2018 FOOT EXAM Q1 10/10/2018 MICROALBUMIN Q1 10/10/2018 BREAST CANCER SCRN MAMMOGRAM 11/7/2019 Allergies as of 1/9/2018  Review Complete On: 1/9/2018 By: Sang Cohn MD  
  
 Severity Noted Reaction Type Reactions Clindamycin  07/02/2014    Unknown (comments) Codeine  07/02/2014    Unknown (comments) Current Immunizations  Never Reviewed No immunizations on file. Not reviewed this visit You Were Diagnosed With   
  
 Codes Comments Well controlled type 2 diabetes mellitus ([facility]    -  Primary ICD-10-CM: E11.9 ICD-9-CM: 250.00 Attention deficit hyperactivity disorder (ADHD), predominantly inattentive type     ICD-10-CM: F90.0 ICD-9-CM: 314.00 Arthralgia of both hands     ICD-10-CM: M25.541, M25.542 ICD-9-CM: 719.44 Attention deficit hyperactivity disorder (ADHD), unspecified ADHD type     ICD-10-CM: F90.9 ICD-9-CM: 314.01 Vitals BP Pulse Temp Resp Height(growth percentile) Weight(growth percentile) 139/82 (BP 1 Location: Right arm) 83 97.5 °F (36.4 °C) (Oral) 18 5' 4\" (1.626 m) 124 lb 2 oz (56.3 kg) SpO2 BMI OB Status Smoking Status 95% 21.31 kg/m2 Menopause Former Smoker Vitals History BMI and BSA Data Body Mass Index Body Surface Area  
 21.31 kg/m 2 1.59 m 2 Preferred Pharmacy Pharmacy Name Phone [address] [facility] [address] [phone] Your Updated Medication List  
  
   
This list is accurate as of: 1/9/18  6:02 PM.  Always use your most recent med list.  
  
  
  
  
 amitriptyline 25 mg tablet Commonly known as:  ELAVIL TAKE THREE TABLETS BY MOUTH AT BEDTIME  
  
 dextroamphetamine-amphetamine 30 mg tablet Commonly known as:  ADDERALL Take 1 daily FLUoxetine 20 mg capsule Commonly known as:  PROzac TAKE ONE CAPSULE BY MOUTH ONCE DAILY * LANTUS 100 unit/mL injection Generic drug:  insulin glargine  
by SubCUTAneous route nightly. 42 units * LANTUS SOLOSTAR 100 unit/mL (3 mL) Inpn Generic drug:  insulin glargine INJECT 42 UNITS SUBCUTANEOUSLY EVERY EVENING  
  
 simvastatin 20 mg tablet Commonly known as:  ZOCOR  
TAKE 1 TABLET BY MOUTH NIGHTLY * Notice: This list has 2 medication(s) that are the same as other medications prescribed for you. Read the directions carefully, and ask your doctor or other care provider to review them with you. Prescriptions Printed Refills  
 dextroamphetamine-amphetamine (ADDERALL) 30 mg tablet 0 Sig: Take 1 daily Class: Print We Performed the Following COLLECTION VENOUS BLOOD,VENIPUNCTURE [id number] CPT(R)] HEMOGLOBIN A1C WITH EAG [91800 CPT(R)] METABOLIC PANEL, COMPREHENSIVE [58918 CPT(R)] RHEUMATOID FACTOR, QL [id number] CPT(R)] Introducing [facility] & HEALTH SERVICES! Werner Blandon introduces Two Tap patient portal. Now you can access parts of your medical record, email your doctor's office, and request medication refills online. 1. In your internet browser, go to [email or web address] 2. Click on the First Time User? Click Here link in the Sign In box. You will see the New Member Sign Up page. 3. Enter your Two Tap Access Code exactly as it appears below.  You will not need to use this code after youve completed the sign-up process. If you do not sign up before the expiration date, you must request a new code. · NetScientific Access Code: [email or web address] Expires: 4/9/2018  6:02 PM 
 
4. Enter the last four digits of your Social Security Number (xxxx) and Date of Birth (mm/dd/yyyy) as indicated and click Submit. You will be taken to the next sign-up page. 5. Create a NetScientific ID. This will be your NetScientific login ID and cannot be changed, so think of one that is secure and easy to remember. 6. Create a NetScientific password. You can change your password at any time. 7. Enter your Password Reset Question and Answer. This can be used at a later time if you forget your password. 8. Enter your e-mail address. You will receive e-mail notification when new information is available in [address] 9. Click Sign Up. You can now view and download portions of your medical record. 10. Click the Download Summary menu link to download a portable copy of your medical information. If you have questions, please visit the Frequently Asked Questions section of the NetScientific website. Remember, NetScientific is NOT to be used for urgent needs. For medical emergencies, dial 911. Now available from your iPhone and Android! Please provide this summary of care documentation to your next provider. Your primary care clinician is listed as Enrique Aguero. If you have any questions after today's visit, please call [phone].

## 2018-01-09 NOTE — PROGRESS NOTES
HISTORY OF PRESENT ILLNESS  Sondra Sanders is a 60 y.o. female. Diabetes   Pertinent negatives include no chest pain. Follow-up   Pertinent negatives include no chest pain. ADD  She is here for a refill of her Adderall. She was started on this at least 10 years ago  by Dr Mark Minor. She was started on this after her son was tested and she realized that this was happening to her as well. She is not able to focus on work without it. She takes it daily and if she skips on the weekends she doesn't get things done at home. She never did well in school due to her inattention. She has tried to go off in the past but did not function well at work. She has been taking it and took her last dose this am.  She does not have any side effects with the meds and is able to sleep. Appetite is pretty good. She takes the meds everyday. Diabetes  Current blood sugars at home have been pretty good fasting. Lab Results   Component Value Date/Time    Hemoglobin A1c 8.5 10/10/2017 06:35 PM   There have been a few  episodes of hypoglycemia when she does not eat right. Taking meds daily . On Lantus at 42 units daily. Is not going to be able to afford the Lantus and may need to change to something else. Last eye exam was a couple of years ago at [facility]. Was going to schedule and time got away from her. Checking feet daily. She is having pain and stiffness in her hands. More difficult to grab and hold things. Both hands. Mom has arthritis but she is not sure what type. Review of Systems   Constitutional: Negative for weight loss. HENT: Negative for congestion and sore throat. Respiratory: Negative for cough. Cardiovascular: Negative for chest pain and palpitations. Gastrointestinal: Negative for diarrhea and heartburn. Genitourinary: Negative. Musculoskeletal: Positive for joint pain. Neurological: Negative for dizziness.    Psychiatric/Behavioral: Negative for depression, hallucinations and suicidal ideas. The patient is nervous/anxious. The patient does not have insomnia. Past Medical History:   Diagnosis Date    ADD (attention deficit disorder)     Allergic rhinitis     Asthma     Bulimia     Diabetes ([facility]     Eczema     GERD (gastroesophageal reflux disease)     Headache(784.0)     Hyperlipidemia associated with type 2 diabetes mellitus ([facility] 10/7/2016    Insomnia      Past Surgical History:   Procedure Laterality Date    HX HERNIA REPAIR       Allergies   Allergen Reactions    Clindamycin Unknown (comments)    Codeine Unknown (comments)       Blood pressure 139/82, pulse 83, temperature 97.5 °F (36.4 °C), temperature source Oral, resp. rate 18, height 5' 4\" (1.626 m), weight 124 lb 2 oz (56.3 kg), SpO2 95 %. Physical Exam   Constitutional: She is oriented to person, place, and time. She appears well-developed and well-nourished. No distress. HENT:   Head: Normocephalic and atraumatic. Nose: Nose normal.   Mouth/Throat: Oropharynx is clear and moist.   Eyes: Conjunctivae are normal.   Neck: Neck supple. Cardiovascular: Normal rate, regular rhythm and normal heart sounds. Pulmonary/Chest: Effort normal and breath sounds normal. No respiratory distress. Musculoskeletal:   Swelling of MCP both hands. Neurological: She is alert and oriented to person, place, and time. Skin: Skin is warm. Psychiatric: She has a normal mood and affect. Nursing note and vitals reviewed. ASSESSMENT and PLAN  ADD   Refill Adderall daily #30     checked                                  RTO 3 months  Diabetes   Check labs- see orders   Continue Lantus for now.     Advised regular eye and foot exams  Joint pain and stiffness   NSAIDs as needed   Check Rheumatoid Factor

## 2018-01-11 LAB
ALBUMIN SERPL-MCNC: 4.3 G/DL (ref 3.6–4.8)
ALBUMIN/GLOB SERPL: 1.6 {RATIO} (ref 1.2–2.2)
ALP SERPL-CCNC: 48 IU/L (ref 39–117)
ALT SERPL-CCNC: 13 IU/L (ref 0–32)
AST SERPL-CCNC: 18 IU/L (ref 0–40)
BILIRUB SERPL-MCNC: 0.4 MG/DL (ref 0–1.2)
BUN SERPL-MCNC: 16 MG/DL (ref 8–27)
BUN/CREAT SERPL: 21 (ref 12–28)
CALCIUM SERPL-MCNC: 9.5 MG/DL (ref 8.7–10.3)
CHLORIDE SERPL-SCNC: 96 MMOL/L (ref 96–106)
CO2 SERPL-SCNC: 28 MMOL/L (ref 18–29)
CREAT SERPL-MCNC: 0.78 MG/DL (ref 0.57–1)
EST. AVERAGE GLUCOSE BLD GHB EST-MCNC: 197 MG/DL
GLOBULIN SER CALC-MCNC: 2.7 G/DL (ref 1.5–4.5)
GLUCOSE SERPL-MCNC: 84 MG/DL (ref 65–99)
HBA1C MFR BLD: 8.5 % (ref 4.8–5.6)
POTASSIUM SERPL-SCNC: 3.9 MMOL/L (ref 3.5–5.2)
PROT SERPL-MCNC: 7 G/DL (ref 6–8.5)
RHEUMATOID FACT SERPL-ACNC: <10 IU/ML (ref 0–13.9)
SODIUM SERPL-SCNC: 139 MMOL/L (ref 134–144)

## 2018-01-12 NOTE — PROGRESS NOTES
Chem panel is good. Rheumatoid factor is negative. A1c is a little too high at 8.5.  Increase lantus to 45 units daily

## 2018-01-21 RX ORDER — AMITRIPTYLINE HYDROCHLORIDE 25 MG/1
TABLET, FILM COATED ORAL
Qty: 90 TAB | Refills: 0 | Status: SHIPPED | OUTPATIENT
Start: 2018-01-21 | End: 2018-03-15 | Stop reason: SDUPTHER

## 2018-02-27 DIAGNOSIS — F90.9 ATTENTION DEFICIT HYPERACTIVITY DISORDER (ADHD), UNSPECIFIED ADHD TYPE: ICD-10-CM

## 2018-02-27 RX ORDER — DEXTROAMPHETAMINE SACCHARATE, AMPHETAMINE ASPARTATE, DEXTROAMPHETAMINE SULFATE AND AMPHETAMINE SULFATE 7.5; 7.5; 7.5; 7.5 MG/1; MG/1; MG/1; MG/1
TABLET ORAL
Qty: 30 TAB | Refills: 0 | Status: SHIPPED | OUTPATIENT
Start: 2018-02-27 | End: 2018-04-09 | Stop reason: SDUPTHER

## 2018-02-28 RX ORDER — FLUOXETINE HYDROCHLORIDE 20 MG/1
CAPSULE ORAL
Qty: 90 CAP | Refills: 0 | Status: SHIPPED | OUTPATIENT
Start: 2018-02-28 | End: 2018-05-16 | Stop reason: SDUPTHER

## 2018-03-15 RX ORDER — AMITRIPTYLINE HYDROCHLORIDE 25 MG/1
TABLET, FILM COATED ORAL
Qty: 90 TAB | Refills: 0 | Status: SHIPPED | OUTPATIENT
Start: 2018-03-15 | End: 2018-04-20 | Stop reason: SDUPTHER

## 2018-04-09 DIAGNOSIS — F90.9 ATTENTION DEFICIT HYPERACTIVITY DISORDER (ADHD), UNSPECIFIED ADHD TYPE: ICD-10-CM

## 2018-04-10 RX ORDER — DEXTROAMPHETAMINE SACCHARATE, AMPHETAMINE ASPARTATE, DEXTROAMPHETAMINE SULFATE AND AMPHETAMINE SULFATE 7.5; 7.5; 7.5; 7.5 MG/1; MG/1; MG/1; MG/1
TABLET ORAL
Qty: 30 TAB | Refills: 0 | Status: SHIPPED | OUTPATIENT
Start: 2018-04-10 | End: 2018-06-04 | Stop reason: SDUPTHER

## 2018-04-11 ENCOUNTER — CLINICAL SUPPORT (OUTPATIENT)
Dept: FAMILY MEDICINE CLINIC | Age: 63
End: 2018-04-11

## 2018-04-11 DIAGNOSIS — F90.0 ATTENTION DEFICIT HYPERACTIVITY DISORDER (ADHD), PREDOMINANTLY INATTENTIVE TYPE: ICD-10-CM

## 2018-04-11 DIAGNOSIS — Z79.899 ENCOUNTER FOR LONG-TERM CURRENT USE OF MEDICATION: Primary | ICD-10-CM

## 2018-04-16 LAB — DRUGS UR: NORMAL

## 2018-04-20 RX ORDER — AMITRIPTYLINE HYDROCHLORIDE 25 MG/1
TABLET, FILM COATED ORAL
Qty: 90 TAB | Refills: 0 | Status: SHIPPED | OUTPATIENT
Start: 2018-04-20 | End: 2018-05-16 | Stop reason: SDUPTHER

## 2018-05-16 RX ORDER — INSULIN GLARGINE 100 [IU]/ML
INJECTION, SOLUTION SUBCUTANEOUS
Qty: 15 ADJUSTABLE DOSE PRE-FILLED PEN SYRINGE | Refills: 3 | Status: SHIPPED | OUTPATIENT
Start: 2018-05-16 | End: 2018-06-15 | Stop reason: SDUPTHER

## 2018-05-16 RX ORDER — FLUOXETINE HYDROCHLORIDE 20 MG/1
CAPSULE ORAL
Qty: 90 CAP | Refills: 1 | Status: SHIPPED | OUTPATIENT
Start: 2018-05-16 | End: 2018-11-25 | Stop reason: SDUPTHER

## 2018-05-16 RX ORDER — AMITRIPTYLINE HYDROCHLORIDE 25 MG/1
TABLET, FILM COATED ORAL
Qty: 90 TAB | Refills: 1 | Status: SHIPPED | OUTPATIENT
Start: 2018-05-16 | End: 2018-06-04 | Stop reason: SDUPTHER

## 2018-05-29 DIAGNOSIS — F90.9 ATTENTION DEFICIT HYPERACTIVITY DISORDER (ADHD), UNSPECIFIED ADHD TYPE: ICD-10-CM

## 2018-05-29 RX ORDER — DEXTROAMPHETAMINE SACCHARATE, AMPHETAMINE ASPARTATE, DEXTROAMPHETAMINE SULFATE AND AMPHETAMINE SULFATE 7.5; 7.5; 7.5; 7.5 MG/1; MG/1; MG/1; MG/1
TABLET ORAL
Qty: 30 TAB | Refills: 0 | OUTPATIENT
Start: 2018-05-29

## 2018-06-04 ENCOUNTER — OFFICE VISIT (OUTPATIENT)
Dept: FAMILY MEDICINE CLINIC | Age: 63
End: 2018-06-04

## 2018-06-04 VITALS
OXYGEN SATURATION: 100 % | WEIGHT: 120.5 LBS | BODY MASS INDEX: 21.35 KG/M2 | SYSTOLIC BLOOD PRESSURE: 157 MMHG | HEIGHT: 63 IN | DIASTOLIC BLOOD PRESSURE: 82 MMHG | HEART RATE: 78 BPM | RESPIRATION RATE: 18 BRPM

## 2018-06-04 DIAGNOSIS — E11.9 WELL CONTROLLED TYPE 2 DIABETES MELLITUS (HCC): Primary | ICD-10-CM

## 2018-06-04 DIAGNOSIS — E78.5 HYPERLIPIDEMIA ASSOCIATED WITH TYPE 2 DIABETES MELLITUS (HCC): ICD-10-CM

## 2018-06-04 DIAGNOSIS — F90.9 ATTENTION DEFICIT HYPERACTIVITY DISORDER (ADHD), UNSPECIFIED ADHD TYPE: ICD-10-CM

## 2018-06-04 DIAGNOSIS — E11.69 HYPERLIPIDEMIA ASSOCIATED WITH TYPE 2 DIABETES MELLITUS (HCC): ICD-10-CM

## 2018-06-04 RX ORDER — DEXTROAMPHETAMINE SACCHARATE, AMPHETAMINE ASPARTATE, DEXTROAMPHETAMINE SULFATE AND AMPHETAMINE SULFATE 7.5; 7.5; 7.5; 7.5 MG/1; MG/1; MG/1; MG/1
TABLET ORAL
Qty: 30 TAB | Refills: 0 | Status: SHIPPED | OUTPATIENT
Start: 2018-06-04 | End: 2018-06-04 | Stop reason: SDUPTHER

## 2018-06-04 RX ORDER — AMITRIPTYLINE HYDROCHLORIDE 25 MG/1
TABLET, FILM COATED ORAL
Qty: 270 TAB | Refills: 1 | Status: SHIPPED | OUTPATIENT
Start: 2018-06-04 | End: 2019-03-21 | Stop reason: SDUPTHER

## 2018-06-04 RX ORDER — DEXTROAMPHETAMINE SACCHARATE, AMPHETAMINE ASPARTATE, DEXTROAMPHETAMINE SULFATE AND AMPHETAMINE SULFATE 7.5; 7.5; 7.5; 7.5 MG/1; MG/1; MG/1; MG/1
TABLET ORAL
Qty: 30 TAB | Refills: 0 | Status: SHIPPED | OUTPATIENT
Start: 2018-06-04 | End: 2018-09-11 | Stop reason: SDUPTHER

## 2018-06-04 RX ORDER — SIMVASTATIN 20 MG/1
20 TABLET, FILM COATED ORAL
Qty: 90 TAB | Refills: 1 | Status: SHIPPED | OUTPATIENT
Start: 2018-06-04 | End: 2019-04-12

## 2018-06-04 NOTE — MR AVS SNAPSHOT
[address] [place] [address] [phone] Patient: Tae Deng MRN: [id number] YWC:4/7/3717 Visit Information Date & Time Provider Department Dept. Phone Encounter #  
 6/4/2018  5:20 PM Geoffrey Duron  [address] [facility] [phone] [id number] Follow-up Instructions Return in about 3 months (around 9/4/2018). Upcoming Health Maintenance Date Due  
 EYE EXAM RETINAL OR DILATED Q1 6/3/1964 Pneumococcal 19-64 Medium Risk (1 of 1 - PPSV23) 6/3/1973 DTaP/Tdap/Td series (1 - Tdap) 6/3/1975 PAP AKA CERVICAL CYTOLOGY 6/3/1975 FOBT Q 1 YEAR AGE 50-75 6/3/2004 ZOSTER VACCINE AGE 60> 4/3/2014 LIPID PANEL Q1 10/4/2017 HEMOGLOBIN A1C Q6M 7/9/2018 Influenza Age 5 to Adult 8/1/2018 FOOT EXAM Q1 10/10/2018 MICROALBUMIN Q1 10/10/2018 BREAST CANCER SCRN MAMMOGRAM 11/7/2019 Allergies as of 6/4/2018  Review Complete On: 6/4/2018 By: Geoffrey Duron MD  
  
 Severity Noted Reaction Type Reactions Clindamycin  07/02/2014    Unknown (comments) Codeine  07/02/2014    Unknown (comments) Current Immunizations  Never Reviewed No immunizations on file. Not reviewed this visit You Were Diagnosed With   
  
 Codes Comments Well controlled type 2 diabetes mellitus ([facility]    -  Primary ICD-10-CM: E11.9 ICD-9-CM: 250.00 Attention deficit hyperactivity disorder (ADHD), predominantly inattentive type     ICD-10-CM: F90.0 ICD-9-CM: 314.00 Attention deficit hyperactivity disorder (ADHD), unspecified ADHD type     ICD-10-CM: F90.9 ICD-9-CM: 314.01 Hyperlipidemia associated with type 2 diabetes mellitus ([facility]     ICD-10-CM: E11.69, E78.5 ICD-9-CM: 250.80, 272.4 Vitals BP Pulse Resp Height(growth percentile) Weight(growth percentile) SpO2  
 157/82 (BP 1 Location: Left arm) 78 18 5' 3\" (1.6 m) 120 lb 8 oz (54.7 kg) 100% BMI OB Status Smoking Status 21.35 kg/m2 Menopause Former Smoker Vitals History BMI and BSA Data Body Mass Index Body Surface Area  
 21.35 kg/m 2 1.56 m 2 Preferred Pharmacy Pharmacy Name Phone [facility] [phone] Your Updated Medication List  
  
   
This list is accurate as of 6/4/18  6:01 PM.  Always use your most recent med list.  
  
  
  
  
 amitriptyline 25 mg tablet Commonly known as:  ELAVIL TAKE THREE TABLETS BY MOUTH AT BEDTIME  
  
 dextroamphetamine-amphetamine 30 mg tablet Commonly known as:  ADDERALL Take 1 daily- fill 8/1/18 FLUoxetine 20 mg capsule Commonly known as:  PROzac TAKE ONE CAPSULE BY MOUTH ONCE DAILY  
  
 insulin glargine 100 unit/mL (3 mL) Inpn Commonly known as:  LANTUS SOLOSTAR U-100 INSULIN INJECT 42 UNITS SUBCUTANEOUSLY EVERY EVENING  
  
 simvastatin 20 mg tablet Commonly known as:  ZOCOR Take 1 Tab by mouth nightly. Prescriptions Printed Refills  
 dextroamphetamine-amphetamine (ADDERALL) 30 mg tablet 0 Sig: Take 1 daily- fill 8/1/18 Class: Print Prescriptions Sent to Pharmacy Refills  
 amitriptyline (ELAVIL) 25 mg tablet 1 Sig: TAKE THREE TABLETS BY MOUTH AT BEDTIME Class: Normal  
 Pharmacy: [address] [place] [address] [facility] Ph #: [phone]  
 simvastatin (ZOCOR) 20 mg tablet 1 Sig: Take 1 Tab by mouth nightly. Class: Normal  
 Pharmacy: [facility] [address] [place] Ph #: [phone] Route: Oral  
  
We Performed the Following COLLECTION VENOUS BLOOD,VENIPUNCTURE D8464044 CPT(R)] HEMOGLOBIN A1C WITH EAG [15472 CPT(R)] LIPID PANEL [29020 CPT(R)] METABOLIC PANEL, COMPREHENSIVE [89026 CPT(R)] REFERRAL TO OPHTHALMOLOGY [REF57 Custom] Follow-up Instructions Return in about 3 months (around 9/4/2018). Referral Information Referral ID Referred By Referred To  
  
 [id number] Daniel Perez MD   
   [id number] [facility], [facility]ordero Phone: [phone] Fax: [phone] Visits Status Start Date End Date 1 New Request 6/4/18 6/4/19 If your referral has a status of pending review or denied, additional information will be sent to support the outcome of this decision. Introducing [facility] [place] [facility]! [facility] introduces [employer] patient portal. Now you can access parts of your medical record, email your doctor's office, and request medication refills online. 1. In your internet browser, go to [email or web address] 2. Click on the First Time User? Click Here link in the Sign In box. You will see the New Member Sign Up page. 3. Enter your [employer] Access Code exactly as it appears below. You will not need to use this code after youve completed the sign-up process. If you do not sign up before the expiration date, you must request a new code. · [employer] Access Code: [email or web address] Expires: 9/2/2018  6:01 PM 
 
4. Enter the last four digits of your Social Security Number (xxxx) and Date of Birth (mm/dd/yyyy) as indicated and click Submit. You will be taken to the next sign-up page. 5. Create a [employer] ID. This will be your [employer] login ID and cannot be changed, so think of one that is secure and easy to remember. 6. Create a [employer] password. You can change your password at any time. 7. Enter your Password Reset Question and Answer. This can be used at a later time if you forget your password. 8. Enter your e-mail address. You will receive e-mail notification when new information is available in [address] 9. Click Sign Up. You can now view and download portions of your medical record.  
10. Click the Download Summary menu link to download a portable copy of your medical information. If you have questions, please visit the Frequently Asked Questions section of the [employer] website. Remember, [employer] is NOT to be used for urgent needs. For medical emergencies, dial 911. Now available from your iPhone and Android! Please provide this summary of care documentation to your next provider. Your primary care clinician is listed as Rayshawn Bond. If you have any questions after today's visit, please call [phone].

## 2018-06-04 NOTE — PROGRESS NOTES
1. Have you been to the ER, urgent care clinic since your last visit? Hospitalized since your last visit? No    2. Have you seen or consulted any other health care providers outside of the [address] [place] [address] since your last visit? Include any pap smears or colon screening.  Yes When: 6-2018

## 2018-06-04 NOTE — PROGRESS NOTES
HISTORY OF PRESENT ILLNESS  Cal Irizarry is a 58 y.o. female. Anxiety   Pertinent negatives include no chest pain. Diabetes   Pertinent negatives include no chest pain. Hypertension    Associated symptoms include anxiety. Pertinent negatives include no chest pain, no palpitations and no dizziness. Follow-up   Pertinent negatives include no chest pain. ADD  She is here for a refill of her Adderall. She was started on this at least 10 years ago by Dr Shana Recinos. She was started on this after her son was tested and she realized that this was happening to her as well. She is not able to focus on work without it. She takes it daily and if she skips on the weekends she doesn't get things done at home. She never did well in school due to her inattention. She has tried to go off in the past but did not function well at work. She has been taking it and took her last dose this am.  She does not have any side effects with the meds and is able to sleep. Appetite is pretty good. She takes the meds everyday. Diabetes  Current blood sugars at home have been variable. Will sometimes be at 200 and other times . Has a bedtime snack usually. No hypoglycemia  Lab Results   Component Value Date/Time    Hemoglobin A1c 8.5 (H) 01/09/2018 05:58 PM   Taking meds daily . On Lantus at 32 units daily. Last eye exam was a couple of years ago [facility]. Was going to schedule and time got away from her. Checking feet daily. Went to see Dr Jeimy Christian last week and had a pap smear. She had a growth at the vagina that was biopsied. The exam was uncomfortable. She is not sexually active. Review of Systems   Constitutional: Negative for weight loss. HENT: Negative for congestion and sore throat. Respiratory: Negative for cough. Cardiovascular: Negative for chest pain and palpitations. Gastrointestinal: Negative for diarrhea and heartburn. Genitourinary: Negative. Musculoskeletal: Positive for joint pain. Neurological: Negative for dizziness. Psychiatric/Behavioral: Negative for depression, hallucinations and suicidal ideas. The patient is nervous/anxious. The patient does not have insomnia. Past Medical History:   Diagnosis Date    ADD (attention deficit disorder)     Allergic rhinitis     Asthma     Bulimia     Diabetes ([facility]     Eczema     GERD (gastroesophageal reflux disease)     Headache(784.0)     Hyperlipidemia associated with type 2 diabetes mellitus ([facility] 10/7/2016    Insomnia      Past Surgical History:   Procedure Laterality Date    HX HERNIA REPAIR       Allergies   Allergen Reactions    Clindamycin Unknown (comments)    Codeine Unknown (comments)       Blood pressure 157/82, pulse 78, resp. rate 18, height 5' 3\" (1.6 m), weight 120 lb 8 oz (54.7 kg), SpO2 100 %. Physical Exam   Constitutional: She is oriented to person, place, and time. She appears well-developed and well-nourished. No distress. HENT:   Head: Normocephalic and atraumatic. Nose: Nose normal.   Mouth/Throat: Oropharynx is clear and moist.   Eyes: Conjunctivae are normal.   Neck: Neck supple. Cardiovascular: Normal rate, regular rhythm and normal heart sounds. Pulmonary/Chest: Effort normal and breath sounds normal. No respiratory distress. Neurological: She is alert and oriented to person, place, and time. Skin: Skin is warm. Psychiatric: She has a normal mood and affect. Nursing note and vitals reviewed. ASSESSMENT and PLAN  ADD   Refill Adderall daily #30 - additional Rx given for 7/2 and 8/1   RTO 3 months  Diabetes   Check labs- see orders   Continue Lantus for now.     Advised regular eye and foot exams- referral to Dr Fercho Soto  Hyperlipidemia   Check lipids   Refill Zocor for 6 months

## 2018-06-05 ENCOUNTER — TELEPHONE (OUTPATIENT)
Dept: FAMILY MEDICINE CLINIC | Age: 63
End: 2018-06-05

## 2018-06-06 ENCOUNTER — DOCUMENTATION ONLY (OUTPATIENT)
Dept: FAMILY MEDICINE CLINIC | Age: 63
End: 2018-06-06

## 2018-06-06 LAB
ALBUMIN SERPL-MCNC: 4 G/DL (ref 3.6–4.8)
ALBUMIN/GLOB SERPL: 1.6 {RATIO} (ref 1.2–2.2)
ALP SERPL-CCNC: 50 IU/L (ref 39–117)
ALT SERPL-CCNC: 13 IU/L (ref 0–32)
AST SERPL-CCNC: 14 IU/L (ref 0–40)
BILIRUB SERPL-MCNC: <0.2 MG/DL (ref 0–1.2)
BUN SERPL-MCNC: 18 MG/DL (ref 8–27)
BUN/CREAT SERPL: 27 (ref 12–28)
CALCIUM SERPL-MCNC: 9.3 MG/DL (ref 8.7–10.3)
CHLORIDE SERPL-SCNC: 97 MMOL/L (ref 96–106)
CHOLEST SERPL-MCNC: 209 MG/DL (ref 100–199)
CO2 SERPL-SCNC: 26 MMOL/L (ref 18–29)
CREAT SERPL-MCNC: 0.66 MG/DL (ref 0.57–1)
EST. AVERAGE GLUCOSE BLD GHB EST-MCNC: 197 MG/DL
GFR SERPLBLD CREATININE-BSD FMLA CKD-EPI: 108 ML/MIN/1.73
GFR SERPLBLD CREATININE-BSD FMLA CKD-EPI: 94 ML/MIN/1.73
GLOBULIN SER CALC-MCNC: 2.5 G/DL (ref 1.5–4.5)
GLUCOSE SERPL-MCNC: 312 MG/DL (ref 65–99)
HBA1C MFR BLD: 8.5 % (ref 4.8–5.6)
HDLC SERPL-MCNC: 81 MG/DL
INTERPRETATION, 910389: NORMAL
LDLC SERPL CALC-MCNC: 100 MG/DL (ref 0–99)
POTASSIUM SERPL-SCNC: 4.3 MMOL/L (ref 3.5–5.2)
PROT SERPL-MCNC: 6.5 G/DL (ref 6–8.5)
SODIUM SERPL-SCNC: 135 MMOL/L (ref 134–144)
TRIGL SERPL-MCNC: 141 MG/DL (ref 0–149)
VLDLC SERPL CALC-MCNC: 28 MG/DL (ref 5–40)

## 2018-06-15 ENCOUNTER — OFFICE VISIT (OUTPATIENT)
Dept: GYNECOLOGY | Age: 63
End: 2018-06-15

## 2018-06-15 VITALS
DIASTOLIC BLOOD PRESSURE: 88 MMHG | WEIGHT: 118.4 LBS | BODY MASS INDEX: 20.98 KG/M2 | SYSTOLIC BLOOD PRESSURE: 152 MMHG | HEIGHT: 63 IN | HEART RATE: 93 BPM

## 2018-06-15 DIAGNOSIS — C51.9 VULVAR CANCER (HCC): Primary | ICD-10-CM

## 2018-06-15 RX ORDER — INSULIN GLARGINE 100 [IU]/ML
INJECTION, SOLUTION SUBCUTANEOUS
Qty: 15 ADJUSTABLE DOSE PRE-FILLED PEN SYRINGE | Refills: 3
Start: 2018-06-15 | End: 2019-04-12

## 2018-06-15 NOTE — PROGRESS NOTES
New Patient, referred by Dr. Sagrario Reese, NP for abnormal Vulva biopsy, Initial blood pressure reading 164/92, repeat blood pressure reading 152/88

## 2018-06-15 NOTE — PROGRESS NOTES
[address] [place] [address] [address] [place]   [phone]  F [phone]    Office Note  Patient ID:  Name:  Ana Cummings  MRN:  [id number]  :  1955/63 y.o. Date:  6/15/2018      HISTORY OF PRESENT ILLNESS:  Ana Cummings is a 58 y.o.  postmenopausal female who is being seen for     ICD-10-CM ICD-9-CM    1. Vulvar cancer ([facility] C51.9 184. 4      The patient presents in referral with a newly diagnosed vulvar cancer. The patient reports a 3-4 week history of vaginal spotting. Examination and subsequent biopsy demonstrated a invasivie SCC of the vulvo-vaginal junction (right). The patient presents at this time for further evaluation and treatment recommendations. Negative  and GI review for dysfunction. Negative cardiopulmonary review. No weight loss  No lower extremity edema. Pertinent PMH/PSH:   Negative gynecology review, no history of abnormal cervical cytology   Normal menopause age 47. IDDM--??control      Active, no restrictions. ROS:   and GI review: Negative  Cardiopulmonary review:Negative   Musculoskeletal:  Negative    A comprehensive review of systems was negative except for that written in the History of Present Illness. , 10 point ROS    OB/GYN ROS:   Denies, dysuria, hematuria, urinary incontinence, vaginal discharge, pelvic pain  Patient denies any abnormal bleeding or vaginal discharge.  .    Karnofsky Score: 100  ECOG stGstrstastdstest:st st1st Problem List:  Patient Active Problem List    Diagnosis Date Noted    Attention deficit hyperactivity disorder (ADHD), predominantly inattentive type 10/10/2017    Elevated blood pressure reading without diagnosis of hypertension 2017    Trigger finger, right ring finger 2017    Trigger finger, left ring finger 2017    Hyperlipidemia associated with type 2 diabetes mellitus ([facility] 10/07/2016    Well controlled type 2 diabetes mellitus ([facility] 2016    Iron deficiency anemia 07/03/2014     PMH:  Past Medical History:   Diagnosis Date    ADD (attention deficit disorder)     Allergic rhinitis     Asthma     Bulimia     Diabetes ([facility]     Eczema     GERD (gastroesophageal reflux disease)     Headache(784.0)     Hyperlipidemia associated with type 2 diabetes mellitus ([facility] 10/7/2016    Insomnia       PSH:  Past Surgical History:   Procedure Laterality Date    HX BREAST AUGMENTATION  1987, 1992    HX HERNIA REPAIR      HX OTHER SURGICAL      tracheal tumor excision, benign    HX OTHER SURGICAL      colon polyp removal    HX TONSILLECTOMY        Social History:  Social History   Substance Use Topics    Smoking status: Former Smoker    Smokeless tobacco: Never Used    Alcohol use No      Family History:  Family History   Problem Relation Age of Onset    Cancer Father      colon    COPD Father     Diabetes Other     Cancer Other      colon, grandfather/ lung cancer uncles    Hypertension Mother     Heart Failure Mother     Obesity Brother 37      Medications: (reviewed)  Current Outpatient Prescriptions   Medication Sig    dextroamphetamine-amphetamine (ADDERALL) 30 mg tablet Take 1 daily- fill 8/1/18    amitriptyline (ELAVIL) 25 mg tablet TAKE THREE TABLETS BY MOUTH AT BEDTIME    simvastatin (ZOCOR) 20 mg tablet Take 1 Tab by mouth nightly.  insulin glargine (LANTUS SOLOSTAR U-100 INSULIN) 100 unit/mL (3 mL) inpn INJECT 42 UNITS SUBCUTANEOUSLY EVERY EVENING    FLUoxetine (PROZAC) 20 mg capsule TAKE ONE CAPSULE BY MOUTH ONCE DAILY     No current facility-administered medications for this visit. Allergies: (reviewed)  Allergies   Allergen Reactions    Clindamycin Unknown (comments)    Codeine Unknown (comments)          OBJECTIVE:    Physical Exam:  VITAL SIGNS: Vitals:    06/15/18 1009 06/15/18 1016   BP: (!) 164/92 152/88   Pulse: 96 93   Weight: 118 lb 6.4 oz (53.7 kg)    Height: 5' 3\" (1.6 m)      Body mass index is 20.97 kg/(m^2).    GENERAL RO: Conversant, alert, oriented. No acute distress. HEENT: HEENT. No thyroid enlargement. No JVD. Neck: Supple without restrictions. RESPIRATORY: Clear to auscultation and percussion to the bases. No CVAT. CARDIOVASC: RRR without murmur/rub. GASTROINT: soft, non-tender, without masses or organomegaly, without guarding, without rebound   MUSCULOSKEL: no joint tenderness, deformity or swelling, full range of motion without pain       EXTREMITIES: extremities normal, atraumatic, no cyanosis or edema, Homans sign is negative, no sign of DVT   PELVIC: External genitalia: Grossly normal labia, upon inspection a 2x1.5 cm raised glandular lesion is noted at the right VV junction, 7:00, minimal submucosal induration. Vaginal: As above, lesion extends to the first 1 cm of vaginal mucosa. No additional lesion. Cervix: Normal, mobile, not friable. No expanded  Adnexa: No suspicious masses, induration or tenderness. Uterus: Midline, small, not tender or enlarged. Inguina: Easily evaluated, no lymphadenopathy, pulses appreciated. RECTAL: Deferred   OLGA SURVEY: No suspicious lymphadenopathy or edema noted. NEURO: Grossly intact. No acute deficit.        Lab Date as available:    Lab Results   Component Value Date/Time    WBC 5.2 10/10/2017 06:35 PM    HGB 11.1 10/10/2017 06:35 PM    HCT 34.5 10/10/2017 06:35 PM    PLATELET 447 15/73/6896 06:35 PM    MCV 84 10/10/2017 06:35 PM     Lab Results   Component Value Date/Time    Sodium 135 06/04/2018 05:56 PM    Potassium 4.3 06/04/2018 05:56 PM    Chloride 97 06/04/2018 05:56 PM    CO2 26 06/04/2018 05:56 PM    Glucose 312 (H) 06/04/2018 05:56 PM    BUN 18 06/04/2018 05:56 PM    Creatinine 0.66 06/04/2018 05:56 PM    BUN/Creatinine ratio 27 06/04/2018 05:56 PM    GFR est  06/04/2018 05:56 PM    GFR est non-AA 94 06/04/2018 05:56 PM    Calcium 9.3 06/04/2018 05:56 PM       Imaging:  None    IMPRESSION/PLAN:    Desiree Charles is a 58 y.o. 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 Problems:     Patient Active Problem List    Diagnosis Date Noted    Attention deficit hyperactivity disorder (ADHD), predominantly inattentive type 10/10/2017    Elevated blood pressure reading without diagnosis of hypertension 02/16/2017    Trigger finger, right ring finger 02/16/2017    Trigger finger, left ring finger 02/16/2017    Hyperlipidemia associated with type 2 diabetes mellitus ([facility] 10/07/2016    Well controlled type 2 diabetes mellitus ([facility] 04/18/2016    Iron deficiency anemia 07/03/2014       I reviewed with Nani Claudio her medical records, physical exam, and review of symptoms. Impression:   Diagnosis:     ICD-10-CM ICD-9-CM    1. Vulvar cancer (HCC) C51.9 184.4       Problem List:       Patient Active Problem List   Diagnosis Code    Iron deficiency anemia D50.9    Well controlled type 2 diabetes mellitus ([facility] E11.9    Hyperlipidemia associated with type 2 diabetes mellitus ([facility] E11.69, E78.5    Elevated blood pressure reading without diagnosis of hypertension R03.0    Trigger finger, right ring finger M65.341    Trigger finger, left ring finger M65.342    Attention deficit hyperactivity disorder (ADHD), predominantly inattentive type F90.0     Plan:   The above noted problems were reviewed with the patient. The chart reviewed and appropriate submitted laboratory studies and radiographs reviewed. I have recommended recommended right radical vulvectomy with selective right groin node biopsy. for both diagnostic and therapeutic intents. The patient is a candidate for a minimally invasive approach but the possibility of open laparotomy has been discussed. The diagnosis and nature of this patient's illness has been discussed. The recommended procedures have been detailed with good understanding.  The rationale, risks and potential benefits of the proposed procedures have been discussed with good understanding and acceptance. Specifically, the risks of bleeding, infection, injury to the  and GI tracks have been addressed. Possible cardiopulmonary complications have been discussed. I would like to minimize the risks of infection and lymphedema in this patient in view of her DM. Will will attempt to fluorometrically isolate the sentinel elsa pattern and perform a limited resection for diagnostic intent. The staging nature of the proposed procedures has been discussed and the possibility of further therapy briefly addressed. Alternatives to the proposed treatment have been discussed including the option of no treatment and the associated potential consequences. Surgery will be scheduled at the patient's convenience. All questions answered    Signed:  Denita Pack MD  6/15/2018    Copy: No ref.  provider found    Defined Sensitive Document    >50% of total time allocated to visit dedicated to counseling, 45 minutes total.    Signed By: Yung Lassiter MD     6/15/2018/10:19 AM

## 2018-06-15 NOTE — PROGRESS NOTES
Spoke with patient. Patient aware of results and states understanding at this time. Patient is aware of results. Patient states that she is on 32 units daily and feels that it should be 35 units at this time. Called and spoke with Dr. Yaw Muro and the patient is to increase lantus to 35 units daily per Dr. Yaw Muro. Patient had been placed on hold and once I had spoken with Kit Milan the patient had hung up to confirm the correct increase of Lantus. Tried to call patient back X 2 and had to [facility].

## 2018-06-18 ENCOUNTER — TELEPHONE (OUTPATIENT)
Dept: GYNECOLOGY | Age: 63
End: 2018-06-18

## 2018-06-18 NOTE — TELEPHONE ENCOUNTER
Pt  to ask what the recovery time for her surgery would be, based on her right radical vulvectomy, right groin node dissection, I told her approximately 4 weeks, and we would know more after he did the surgery due to at this point we don't know the extent of what Dr. Terrence Lee will do, pt states she had to go back to work after one week, or she would lose her job, I advised that may not be advised that we would know more after surgery, pt was insistent that she would return to work after one week, I advised I would let Dr. Terrence Lee know if her intent

## 2018-08-30 ENCOUNTER — OFFICE VISIT (OUTPATIENT)
Dept: FAMILY MEDICINE CLINIC | Age: 63
End: 2018-08-30

## 2018-08-30 VITALS
RESPIRATION RATE: 18 BRPM | DIASTOLIC BLOOD PRESSURE: 72 MMHG | SYSTOLIC BLOOD PRESSURE: 136 MMHG | WEIGHT: 118 LBS | HEART RATE: 89 BPM | OXYGEN SATURATION: 99 % | HEIGHT: 63 IN | BODY MASS INDEX: 20.91 KG/M2 | TEMPERATURE: 99.5 F

## 2018-08-30 DIAGNOSIS — Z97.2 POORLY FITTING DENTURES: ICD-10-CM

## 2018-08-30 DIAGNOSIS — F90.9 ATTENTION DEFICIT HYPERACTIVITY DISORDER (ADHD), UNSPECIFIED ADHD TYPE: ICD-10-CM

## 2018-08-30 DIAGNOSIS — E11.9 WELL CONTROLLED TYPE 2 DIABETES MELLITUS (HCC): ICD-10-CM

## 2018-08-30 DIAGNOSIS — F32.A DEPRESSION, UNSPECIFIED DEPRESSION TYPE: ICD-10-CM

## 2018-08-30 DIAGNOSIS — K08.89 POORLY FITTING DENTURES: ICD-10-CM

## 2018-08-30 DIAGNOSIS — K04.7 ABSCESSED TOOTH: Primary | ICD-10-CM

## 2018-08-30 RX ORDER — AMOXICILLIN AND CLAVULANATE POTASSIUM 875; 125 MG/1; MG/1
1 TABLET, FILM COATED ORAL EVERY 12 HOURS
Qty: 20 TAB | Refills: 0 | Status: SHIPPED | OUTPATIENT
Start: 2018-08-30 | End: 2018-09-09

## 2018-08-30 NOTE — PATIENT INSTRUCTIONS
Abscessed Tooth: Care Instructions  Your Care Instructions    An abscessed tooth is a tooth that has a pocket of pus in the tissues around it. Pus forms when the body tries to fight an infection caused by bacteria. If the pus cannot drain, it forms an abscess. An abscessed tooth can cause red, swollen gums and throbbing pain, especially when you chew. You may have a bad taste in your mouth and a fever, and your jaw may swell. Damage to the tooth, untreated tooth decay, or gum disease can cause an abscessed tooth. An abscessed tooth needs to be treated by a dental professional right away. If it is not treated, the infection could spread to other parts of your body. Your dentist will give you antibiotics to stop the infection. He or she may make a hole in the tooth or cut open (domitila) the abscess inside your mouth so that the infection can drain, which should relieve your pain. You may need to have a root canal treatment, which tries to save your tooth by taking out the infected pulp and replacing it with a healing medicine and/or a filling. If these treatments do not work, your tooth may have to be removed. Follow-up care is a key part of your treatment and safety. Be sure to make and go to all appointments, and call your doctor if you are having problems. It's also a good idea to know your test results and keep a list of the medicines you take. How can you care for yourself at home? · Reduce pain and swelling in your face and jaw by putting ice or a cold pack on the outside of your cheek for 10 to 20 minutes at a time. Put a thin cloth between the ice and your skin. · Take pain medicines exactly as directed. ¨ If the doctor gave you a prescription medicine for pain, take it as prescribed. ¨ If you are not taking a prescription pain medicine, ask your doctor if you can take an over-the-counter medicine. · Take your antibiotics as directed. Do not stop taking them just because you feel better.  You need to take the full course of antibiotics. To prevent tooth abscess  · Brush and floss every day, and have regular dental checkups. · Eat a healthy diet, and avoid sugary foods and drinks. · Do not smoke, use e-cigarettes with nicotine, or use spit tobacco. Tobacco and nicotine slow your ability to heal. Tobacco also increases your risk for gum disease and cancer of the mouth and throat. If you need help quitting, talk to your doctor about stop-smoking programs and medicines. These can increase your chances of quitting for good. When should you call for help? Call 911 anytime you think you may need emergency care. For example, call if:    · You have trouble breathing.    Call your doctor now or seek immediate medical care if:    · You have new or worse symptoms of infection, such as:  ¨ Increased pain, swelling, warmth, or redness. ¨ Red streaks leading from the area. ¨ Pus draining from the area. ¨ A fever.    Watch closely for changes in your health, and be sure to contact your doctor if:    · You do not get better as expected. Where can you learn more? Go to http://beni-doron.info/. Enter I682 in the search box to learn more about \"Abscessed Tooth: Care Instructions. \"  Current as of: May 12, 2017  Content Version: 11.7  © 2912-4187 [employer]. Care instructions adapted under license by [employer] (which disclaims liability or warranty for this information). If you have questions about a medical condition or this instruction, always ask your healthcare professional. [place] [address] any warranty or liability for your use of this information.

## 2018-08-30 NOTE — PROGRESS NOTES
1. Have you been to the ER, urgent care clinic since your last visit? Hospitalized since your last visit? No    2. Have you seen or consulted any other health care providers outside of the [address] [place] [address] since your last visit? Include any pap smears or colon screening.  No

## 2018-08-30 NOTE — PROGRESS NOTES
Subjective:     CC: tooth pain    Sarai Lawrence is a 58 y.o. female who presents today with c/o tooth pain. She has 2 molars on her bottom jaw that have been painful for a while and are gradually getting worse. Says they are decaying near the gum line. Has had a hard time chewing and reports headaches. Heating pad helps the pain. Has been taking Ibuprofen 1000mg per day. Had surgery in July and had some left-over oxycodone 5mg pills so she has been taken one of those for breakthrough pain. She has not seen a dentist in over 4 years. The last time she saw one was to get dentures as she is missing all of her upper teeth. The dentures she has now do not fit well and are very uncomfortable. She does not want to go back to this same dentist (Dr. Jessica Bateman) for this reason. She says while she is here she would also like to have her 3 month check-up for her med refills. She has a hx of diabetes and has been giving herself 32 units of Lantus insulin every evening. Was supposed to be doing 35 units due to elevated A1C 8.5% 3 months ago but she has had a lot of hypoglycemic episodes. She does not eat before bedtime because she has GERD so she keeps a PB and J sandwich next to her bed at night in case she wakes up low. Otherwise she eats little snacks all day long, has not been eating much due to pain. Has not been checking her glucose. Denies any neuropathy. No changes in vision, had an eye exam at [facility] - went this year. Was told she has ocular migraines and the beginning of cataracts. Takes Elavil at night for sleep and prozac during the day for depression and anxiety. Says both are working well for her. Takes Adderall 30mg daily for ADD. Has a hard time getting things done without it when she skips a dose. Has a good appetite and symptoms are well-controlled. Takes it every day except for today because it took her along time to get out of bed due to tooth pain.      Patient Active Problem List Diagnosis Code    Iron deficiency anemia D50.9    Well controlled type 2 [facility] mellitus ([facility] E11.9    Hyperlipidemia associated with type 2 diabetes mellitus ([facility] E11.69, E78.5    Elevated blood pressure reading without diagnosis of hypertension R03.0    Trigger finger, right ring finger M65.341    Trigger finger, left ring finger M65.342    Attention deficit hyperactivity disorder (ADHD), predominantly inattentive type F90.0    Vulvar cancer (HCC) C51.9       Past Medical History:   Diagnosis Date    ADD (attention deficit disorder)     Allergic rhinitis     Asthma     Bulimia     Diabetes ([facility]     Eczema     GERD (gastroesophageal reflux disease)     Headache(784.0)     Hyperlipidemia associated with type 2 diabetes mellitus ([facility] 10/7/2016    Insomnia     Vulvar cancer (HCC)          Current Outpatient Prescriptions:     insulin glargine (LANTUS SOLOSTAR U-100 INSULIN) 100 unit/mL (3 mL) inpn, INJECT 35 UNITS SUBCUTANEOUSLY EVERY EVENING, Disp: 15 Adjustable Dose Pre-filled Pen Syringe, Rfl: 3    dextroamphetamine-amphetamine (ADDERALL) 30 mg tablet, Take 1 daily- fill 8/1/18, Disp: 30 Tab, Rfl: 0    amitriptyline (ELAVIL) 25 mg tablet, TAKE THREE TABLETS BY MOUTH AT BEDTIME, Disp: 270 Tab, Rfl: 1    simvastatin (ZOCOR) 20 mg tablet, Take 1 Tab by mouth nightly., Disp: 90 Tab, Rfl: 1    FLUoxetine (PROZAC) 20 mg capsule, TAKE ONE CAPSULE BY MOUTH ONCE DAILY, Disp: 90 Cap, Rfl: 1    Allergies   Allergen Reactions    Clindamycin Unknown (comments)    Codeine Unknown (comments)       Past Surgical History:   Procedure Laterality Date    HX BREAST AUGMENTATION  1987, 1992    HX GYN Right 07/2018    right radical vulvectomy with sentinal node lymph node dissection     HX HERNIA REPAIR      HX OTHER SURGICAL      tracheal tumor excision, benign    HX OTHER SURGICAL      colon polyp removal    HX TONSILLECTOMY         History   Smoking Status    Former Smoker   Smokeless Tobacco  Never Used       Social History     Social History    Marital status:      Spouse name: N/A    Number of children: N/A    Years of education: N/A     Social History Main Topics    Smoking status: Former Smoker    Smokeless tobacco: Never Used    Alcohol use No    Drug use: None    Sexual activity: Not Asked     Other Topics Concern    None     Social History Narrative       Family History   Problem Relation Age of Onset    Cancer Father      colon    COPD Father     Diabetes Other     Cancer Other      colon, grandfather/ lung cancer uncles    Hypertension Mother     Heart Failure Mother     Obesity Brother 37       ROS:  Gen: + low grade fever, chills, and fatigue. HEENT:+ H/A and tooth pain on bottom jaw (1 on both sides), No nasal congestion, runny nose, ear pain, or sore throat  Resp: denies dyspnea, cough, or wheezing  CV: denies chest pain, pressure, or palpitations  Extremeties: denies edema  GI[de-identified] denies nausea or vomiting  Musculoskeletal: no joint pain, stiffness, or muscle cramps  Neuro: denies numbness/tingling or dizziness  Skin: denies rashes or new lesions   Psych: denies anxiety, depression, insomnia, or other changes in mood  Heme: no lymphadenopathy    Objective:     Visit Vitals    /72 (BP 1 Location: Left arm, BP Patient Position: Sitting)    Pulse 89    Temp 99.5 °F (37.5 °C) (Oral)    Resp 18    Ht 5' 3\" (1.6 m)    Wt 118 lb (53.5 kg)    SpO2 99%    BMI 20.9 kg/m2     Body mass index is 20.9 kg/(m^2). General: Alert and oriented. No acute distress, fidgety. Well nourished. HEENT :No sinus tenderness  Ears:TMs normal. Canals clear. Eyes: Sclera white, conjunctiva clear. PERRLA. Extra ocular movements intact. Nose: Patent. Nasal mucosa pink, non-edematous, and without drainage.   Mouth: Pharynx non-erythematous, without exudate   Teeth: + Grey/yellow discoloration and decay present at base/root of last bottom molar on the right and third to last bottom molar on the left. Neck: Supple with FROM. No lymphadenopathy  Lungs: Breathing even and unlabored. All lobes clear to auscultation bilaterally   Heart :RRR, S1 and S2 normal intensity, no extra heart sounds  Extremities: Non-edematous  Neuro: Cranial nerves grossly normal.  Psych: Mood is pleasant, seems a little anxious and fidgety. Thought content appropriate for situation. Dressed appropriately and with good hygiene. Skin: Warm, dry, and intact. No lesions or discoloration. No results found for this visit on 08/30/18. Assessment/ Plan:     1. Abscessed tooth  Start Augmentin 875-125mg po BID x 10 days  Pt says she will try to find a new dentist who can fix/pull her teeth and fix her dentures. 2. Poorly-fitting dentures  Pt says she find a new dentist soon who can correct or make her new dentures. 3. Diabetes  A1C today- will call pt with results. Cont 32 units of Lantus insulin every evening. Pt should start eating more once her teeth issues are resolved which hopefully will reduce hypoglycemic episodes. Check glucose BID and record in log to bring to appts  Cont low carb, sugar-free diet with reg exercise. F/U 3 months or sooner if problems arise    4. ADD  Cont Adderall 30mg daily   checked- does not show where Adderall has been filled at all. [facility] called and verified she last picked up the Adderall on 8/8/18. F/U 3 months    5. Depression/ anxiety  Cont Elavil and Prozac daily  Notify PCP if depression or anxiety worsens. Verbal and written instructions (see AVS) provided.  Patient expresses understanding of diagnosis and treatment plan.     Health Maintenance Due   Topic Date Due    EYE EXAM RETINAL OR DILATED Q1  06/03/1964    Pneumococcal 19-64 Highest Risk (1 of 3 - PCV13) 06/03/1973    DTaP/Tdap/Td series (1 - Tdap) 06/03/1975    PAP AKA CERVICAL CYTOLOGY  06/03/1975    FOBT Q 1 YEAR AGE 50-75  06/03/2004    ZOSTER VACCINE AGE 60>  04/03/2014    Influenza Age 5 to Adult  08/01/2018         Follow-up Disposition: Not on File      Alida Segovia, NP

## 2018-08-30 NOTE — MR AVS SNAPSHOT
[address] [place] [address] [phone] Patient: Jennifer Muñiz MRN: [id number] THE:[birth year] Visit Information Date & Time Provider Department Dept. Phone Encounter #  
 8/30/2018  3:00 PM Odessa Calvert  [address] [facility] [phone] [id number] Follow-up Instructions Return in about 3 months (around 11/30/2018). Upcoming Health Maintenance Date Due  
 EYE EXAM RETINAL OR DILATED Q1 6/3/1964 Pneumococcal 19-64 Highest Risk (1 of 3 - PCV13) 6/3/1973 DTaP/Tdap/Td series (1 - Tdap) 6/3/1975 PAP AKA CERVICAL CYTOLOGY 6/3/1975 FOBT Q 1 YEAR AGE 50-75 6/3/2004 ZOSTER VACCINE AGE 60> 4/3/2014 Influenza Age 5 to Adult 8/1/2018 FOOT EXAM Q1 10/10/2018 MICROALBUMIN Q1 10/10/2018 HEMOGLOBIN A1C Q6M 12/4/2018 LIPID PANEL Q1 6/4/2019 BREAST CANCER SCRN MAMMOGRAM 11/7/2019 Allergies as of 8/30/2018  Review Complete On: 8/30/2018 By: Jovany Gates LPN Severity Noted Reaction Type Reactions Clindamycin  07/02/2014    Unknown (comments) Codeine  07/02/2014    Unknown (comments) Current Immunizations  Never Reviewed No immunizations on file. Not reviewed this visit You Were Diagnosed With   
  
 Codes Comments Abscessed tooth    -  Primary ICD-10-CM: K04.7 ICD-9-CM: 522.5 Well controlled type 2 diabetes mellitus ([facility]     ICD-10-CM: E11.9 ICD-9-CM: 250.00 Poorly fitting dentures     ICD-10-CM: K08.89, Z97.2 ICD-9-CM: V41.6 Vitals BP Pulse Temp Resp Height(growth percentile) Weight(growth percentile) 136/72 (BP 1 Location: Left arm, BP Patient Position: Sitting) 89 99.5 °F (37.5 °C) (Oral) 18 5' 3\" (1.6 m) 118 lb (53.5 kg) SpO2 BMI OB Status Smoking Status 99% 20.9 kg/m2 Menopause Former Smoker Vitals History BMI and BSA Data  Body Mass Index Body Surface Area  
 20.9 kg/m 2 1.54 m 2  
  
  
 Preferred Pharmacy Pharmacy Name Phone [facility] [address] [phone] Your Updated Medication List  
  
   
This list is accurate as of 8/30/18  3:54 PM.  Always use your most recent med list.  
  
  
  
  
 amitriptyline 25 mg tablet Commonly known as:  ELAVIL TAKE THREE TABLETS BY MOUTH AT BEDTIME  
  
 amoxicillin-clavulanate 875-125 mg per tablet Commonly known as:  AUGMENTIN Take 1 Tab by mouth every twelve (12) hours for 10 days. dextroamphetamine-amphetamine 30 mg tablet Commonly known as:  ADDERALL Take 1 daily- fill 8/1/18 FLUoxetine 20 mg capsule Commonly known as:  PROzac TAKE ONE CAPSULE BY MOUTH ONCE DAILY  
  
 insulin glargine 100 unit/mL (3 mL) Inpn Commonly known as:  LANTUS SOLOSTAR U-100 INSULIN INJECT 35 UNITS SUBCUTANEOUSLY EVERY EVENING  
  
 simvastatin 20 mg tablet Commonly known as:  ZOCOR Take 1 Tab by mouth nightly. Prescriptions Sent to Pharmacy Refills  
 amoxicillin-clavulanate (AUGMENTIN) 875-125 mg per tablet 0 Sig: Take 1 Tab by mouth every twelve (12) hours for 10 days. Class: Normal  
 [facility] [address] Ph #: [phone] Route: Oral  
  
We Performed the Following HEMOGLOBIN A1C WITH EAG [91704 CPT(R)] REFERRAL TO DENTISTRY [REF18 Custom] Comments:  
 Do NOT send to Dr Bertin Garcia, need urgent appt for tooth abscess and someone who is good at fitting dentures Follow-up Instructions Return in about 3 months (around 11/30/2018). Referral Information Referral ID Referred By Referred To  
  
 [id number] Jeni MYERS Not Available Visits Status Start Date End Date 1 New Request 8/30/18 8/30/19 If your referral has a status of pending review or denied, additional information will be sent to support the outcome of this decision. Patient Instructions Abscessed Tooth: Care Instructions Your Care Instructions An abscessed tooth is a tooth that has a pocket of pus in the tissues around it. Pus forms when the body tries to fight an infection caused by bacteria. If the pus cannot drain, it forms an abscess. An abscessed tooth can cause red, swollen gums and throbbing pain, especially when you chew. You may have a bad taste in your mouth and a fever, and your jaw may swell. Damage to the tooth, untreated tooth decay, or gum disease can cause an abscessed tooth. An abscessed tooth needs to be treated by a dental professional right away. If it is not treated, the infection could spread to other parts of your body. Your dentist will give you antibiotics to stop the infection. He or she may make a hole in the tooth or cut open (domitila) the abscess inside your mouth so that the infection can drain, which should relieve your pain. You may need to have a root canal treatment, which tries to save your tooth by taking out the infected pulp and replacing it with a healing medicine and/or a filling. If these treatments do not work, your tooth may have to be removed. Follow-up care is a key part of your treatment and safety. Be sure to make and go to all appointments, and call your doctor if you are having problems. It's also a good idea to know your test results and keep a list of the medicines you take. How can you care for yourself at home? · Reduce pain and swelling in your face and jaw by putting ice or a cold pack on the outside of your cheek for 10 to 20 minutes at a time. Put a thin cloth between the ice and your skin. · Take pain medicines exactly as directed. ¨ If the doctor gave you a prescription medicine for pain, take it as prescribed. ¨ If you are not taking a prescription pain medicine, ask your doctor if you can take an over-the-counter medicine. · Take your antibiotics as directed.  Do not stop taking them just because you feel better. You need to take the full course of antibiotics. To prevent tooth abscess · Brush and floss every day, and have regular dental checkups. · Eat a healthy diet, and avoid sugary foods and drinks. · Do not smoke, use e-cigarettes with nicotine, or use spit tobacco. Tobacco and nicotine slow your ability to heal. Tobacco also increases your risk for gum disease and cancer of the mouth and throat. If you need help quitting, talk to your doctor about stop-smoking programs and medicines. These can increase your chances of quitting for good. When should you call for help? Call 911 anytime you think you may need emergency care. For example, call if: 
  · You have trouble breathing.  
 Call your doctor now or seek immediate medical care if: 
  · You have new or worse symptoms of infection, such as: 
¨ Increased pain, swelling, warmth, or redness. ¨ Red streaks leading from the area. ¨ Pus draining from the area. ¨ A fever.  
 Watch closely for changes in your health, and be sure to contact your doctor if: 
  · You do not get better as expected. Where can you learn more? Go to http://beni-doron.info/. Enter G999 in the search box to learn more about \"Abscessed Tooth: Care Instructions. \" Current as of: May 12, 2017 Content Version: 11.7 © 2692-7634 [employer]. Care instructions adapted under license by [employer]lett[employer] (which disclaims liability or warranty for this information). If you have questions about a medical condition or this instruction, always ask your healthcare professional. [place] [address] any warranty or liability for your use of this information. Introducing [facility]! Alexandre Bullard introduces [email or web address] patient portal. Now you can access parts of your medical record, email your doctor's office, and request medication refills online.    
 
1. In your internet browser, go to [email or web address] 2. Click on the First Time User? Click Here link in the Sign In box. You will see the New Member Sign Up page. 3. Enter your SilMach Access Code exactly as it appears below. You will not need to use this code after youve completed the sign-up process. If you do not sign up before the expiration date, you must request a new code. · SilMach Access Code: [email or web address] Expires: 9/2/2018  6:01 PM 
 
4. Enter the last four digits of your Social Security Number (xxxx) and Date of Birth (mm/dd/yyyy) as indicated and click Submit. You will be taken to the next sign-up page. 5. Create a [employer] ID. This will be your SilMach login ID and cannot be changed, so think of one that is secure and easy to remember. 6. Create a SilMach password. You can change your password at any time. 7. Enter your Password Reset Question and Answer. This can be used at a later time if you forget your password. 8. Enter your e-mail address. You will receive e-mail notification when new information is available in [address] 9. Click Sign Up. You can now view and download portions of your medical record. 10. Click the Download Summary menu link to download a portable copy of your medical information. If you have questions, please visit the Frequently Asked Questions section of the SilMach website. Remember, SilMach is NOT to be used for urgent needs. For medical emergencies, dial 911. Now available from your iPhone and Android! Please provide this summary of care documentation to your next provider. Your primary care clinician is listed as Davey Navarro. If you have any questions after today's visit, please call [phone].

## 2018-08-31 LAB
EST. AVERAGE GLUCOSE BLD GHB EST-MCNC: 177 MG/DL
HBA1C MFR BLD: 7.8 % (ref 4.8–5.6)

## 2018-10-16 DIAGNOSIS — F90.9 ATTENTION DEFICIT HYPERACTIVITY DISORDER (ADHD), UNSPECIFIED ADHD TYPE: ICD-10-CM

## 2018-10-16 RX ORDER — DEXTROAMPHETAMINE SACCHARATE, AMPHETAMINE ASPARTATE, DEXTROAMPHETAMINE SULFATE AND AMPHETAMINE SULFATE 7.5; 7.5; 7.5; 7.5 MG/1; MG/1; MG/1; MG/1
TABLET ORAL
Qty: 30 TAB | Refills: 0 | Status: SHIPPED | OUTPATIENT
Start: 2018-10-16 | End: 2018-11-19 | Stop reason: SDUPTHER

## 2018-11-10 RX ORDER — INSULIN GLARGINE 100 [IU]/ML
INJECTION, SOLUTION SUBCUTANEOUS
Qty: 15 ADJUSTABLE DOSE PRE-FILLED PEN SYRINGE | Refills: 3 | Status: SHIPPED | OUTPATIENT
Start: 2018-11-10 | End: 2019-03-26

## 2018-11-26 RX ORDER — FLUOXETINE HYDROCHLORIDE 20 MG/1
CAPSULE ORAL
Qty: 90 CAP | Refills: 1 | Status: SHIPPED | OUTPATIENT
Start: 2018-11-26 | End: 2019-02-17 | Stop reason: SDUPTHER

## 2019-02-17 RX ORDER — FLUOXETINE HYDROCHLORIDE 20 MG/1
CAPSULE ORAL
Qty: 90 CAP | Refills: 1 | Status: SHIPPED | OUTPATIENT
Start: 2019-02-17 | End: 2019-04-12

## 2019-03-21 RX ORDER — AMITRIPTYLINE HYDROCHLORIDE 25 MG/1
TABLET, FILM COATED ORAL
Qty: 270 TAB | Refills: 1 | Status: ON HOLD | OUTPATIENT
Start: 2019-03-21 | End: 2019-03-27 | Stop reason: CLARIF

## 2019-03-27 PROBLEM — M79.661 PAIN AND SWELLING OF LOWER LEG, RIGHT: Status: ACTIVE | Noted: 2019-03-27

## 2019-03-27 PROBLEM — M79.89 PAIN AND SWELLING OF LOWER LEG, RIGHT: Status: ACTIVE | Noted: 2019-03-27

## 2019-03-27 PROBLEM — M79.661 PAIN AND SWELLING OF RIGHT LOWER LEG: Status: ACTIVE | Noted: 2019-03-27

## 2019-03-27 PROBLEM — M79.89 PAIN AND SWELLING OF RIGHT LOWER LEG: Status: ACTIVE | Noted: 2019-03-27

## 2019-03-27 PROBLEM — E11.65 HYPERGLYCEMIA DUE TO TYPE 2 DIABETES MELLITUS (HCC): Chronic | Status: ACTIVE | Noted: 2019-03-27

## 2019-04-06 PROBLEM — I82.409 DVT (DEEP VENOUS THROMBOSIS) (HCC): Status: ACTIVE | Noted: 2019-04-06

## 2019-04-06 PROBLEM — Z51.89 ENCOUNTER FOR REHABILITATION: Status: ACTIVE | Noted: 2019-04-06

## 2019-04-06 PROBLEM — I82.411 DEEP VENOUS THROMBOSIS OF RIGHT PROFUNDA FEMORIS VEIN (HCC): Status: ACTIVE | Noted: 2019-04-06

## 2019-04-06 PROBLEM — I26.99 BILATERAL PULMONARY EMBOLISM (HCC): Status: ACTIVE | Noted: 2019-04-06

## 2021-03-14 NOTE — PROGRESS NOTES
HISTORY OF PRESENT ILLNESS  Khadijah Johnson is a 60 y.o. female. Medication Refill   Pertinent negatives include no chest pain, no abdominal pain, no headaches and no shortness of breath. Follow-up   Pertinent negatives include no chest pain, no abdominal pain, no headaches and no shortness of breath. ADD  She is here for a refill of her Adderall. [facility] She was started on this after her son was tested and she realized that this was happening to her as well. She is not able to focus on work without it. She takes it daily and if she skips on the weekends she doesn't get things done at home. She never did well in school due to her inattention. She has tried to go off in the past but did not function well at work. She has been taking it and took her last dose this am.  She does not have any side effects with the meds and is able to sleep. Appetite is pretty good. She takes the meds everyday. She has not had the meds for a few days. Diabetes  Current blood sugars at home have been pretty good fasting. Lab Results   Component Value Date/Time    Hemoglobin A1c 7.8 01/03/2017 05:00 PM   There have been a few  episodes of hypoglycemia when she does not eat right. Taking meds daily . On Lantus at 42 units daily. Is not going to be able to afford the Lantus and may need to change to something else. Last eye exam was a couple of years ago at [facility]. Checking feet daily. Review of Systems   Constitutional: Negative for malaise/fatigue and weight loss. HENT: Negative for congestion and sore throat. Respiratory: Negative for shortness of breath. Cardiovascular: Negative for chest pain and palpitations. Gastrointestinal: Negative for abdominal pain, blood in stool, diarrhea and heartburn. Genitourinary: Negative. Musculoskeletal: Positive for joint pain. Neurological: Negative for dizziness and headaches.    Psychiatric/Behavioral: Negative for depression, hallucinations and suicidal ideas. The patient is nervous/anxious. The patient does not have insomnia. Past Medical History:   Diagnosis Date    ADD (attention deficit disorder)     Allergic rhinitis     Asthma     Bulimia     Diabetes ([facility] [address]     Eczema     GERD (gastroesophageal reflux disease)     Headache(784.0)     Hyperlipidemia associated with type 2 diabetes mellitus ([facility] [address] 10/7/2016    Insomnia      Past Surgical History:   Procedure Laterality Date    HX HERNIA REPAIR       Allergies   Allergen Reactions    Clindamycin Unknown (comments)    Codeine Unknown (comments)       Blood pressure 152/88, pulse 74, temperature 97.2 °F (36.2 °C), resp. rate 18, height 5' 4.5\" (1.638 m), weight 124 lb (56.2 kg), SpO2 95 %. Physical Exam   Constitutional: She is oriented to person, place, and time. She appears well-developed and well-nourished. No distress. HENT:   Head: Normocephalic and atraumatic. Nose: Nose normal.   Mouth/Throat: Oropharynx is clear and moist.   Eyes: Conjunctivae are normal.   Neck: Neck supple. Cardiovascular: Normal rate, regular rhythm and normal heart sounds. Pulmonary/Chest: Effort normal and breath sounds normal. No respiratory distress. Neurological: She is alert and oriented to person, place, and time. Skin: Skin is warm and dry. Psychiatric: She has a normal mood and affect. Nursing note and vitals reviewed. ASSESSMENT and PLAN  ADD   UDS up to date and appropriately positive       Refill Adderall daily #30 with 2 additional refills for 7/11 and 8/9                                    RTO 3 months  Diabetes   Check labs- see orders   Continue Lantus for now.     Advised regular eye and foot exams I concur with the Admission Order and I certify that services are provided in accordance with Section 42 CFR § 412.3